# Patient Record
Sex: FEMALE | Race: WHITE | Employment: UNEMPLOYED | URBAN - METROPOLITAN AREA
[De-identification: names, ages, dates, MRNs, and addresses within clinical notes are randomized per-mention and may not be internally consistent; named-entity substitution may affect disease eponyms.]

---

## 2018-04-23 ENCOUNTER — HOSPITAL ENCOUNTER (EMERGENCY)
Age: 53
Discharge: HOME | End: 2018-04-23
Attending: EMERGENCY MEDICINE
Payer: SELF-PAY

## 2018-04-23 VITALS
DIASTOLIC BLOOD PRESSURE: 99 MMHG | OXYGEN SATURATION: 100 % | TEMPERATURE: 98.2 F | HEART RATE: 76 BPM | SYSTOLIC BLOOD PRESSURE: 141 MMHG | WEIGHT: 145 LBS | RESPIRATION RATE: 18 BRPM | HEIGHT: 66 IN | BODY MASS INDEX: 23.3 KG/M2

## 2018-04-23 DIAGNOSIS — T25.422A: Primary | ICD-10-CM

## 2018-04-23 PROCEDURE — 74011250636 HC RX REV CODE- 250/636: Performed by: PHYSICIAN ASSISTANT

## 2018-04-23 PROCEDURE — 99283 EMERGENCY DEPT VISIT LOW MDM: CPT | Performed by: PHYSICIAN ASSISTANT

## 2018-04-23 PROCEDURE — 90715 TDAP VACCINE 7 YRS/> IM: CPT | Performed by: PHYSICIAN ASSISTANT

## 2018-04-23 PROCEDURE — 90471 IMMUNIZATION ADMIN: CPT | Performed by: PHYSICIAN ASSISTANT

## 2018-04-23 RX ORDER — TRAMADOL HYDROCHLORIDE 50 MG/1
50 TABLET ORAL
Qty: 10 TAB | Refills: 0 | Status: SHIPPED | OUTPATIENT
Start: 2018-04-23 | End: 2019-06-27

## 2018-04-23 RX ADMIN — TETANUS TOXOID, REDUCED DIPHTHERIA TOXOID AND ACELLULAR PERTUSSIS VACCINE, ADSORBED 0.5 ML: 5; 2.5; 8; 8; 2.5 SUSPENSION INTRAMUSCULAR at 03:29

## 2018-04-23 NOTE — DISCHARGE INSTRUCTIONS
Wash affected area with soap and water twice daily then apply topical antibiotic ointment. Keep left foot elevated as much as tolerated. Chemical Bhandari: Care Instructions  Your Care Instructions    Sunday Pare can occur when a harmful chemical,such as a cleaning product or an acid, splashes onto the skin. The amount of damage to the skin depends on how strong the chemical was, how much of it was on the skin, and how long it was there. Chemical burns, even minor ones, can be very painful. A minor burn may heal within a few days. But a more serious burn may take weeks or even months to heal completely. When the skin is damaged by a burn, it may become infected. You can help prevent infection and help your burn heal. Keep the burn clean, and change the bandages often. Taking good care of the burn as it heals may help prevent bad scars. The treatment for most chemical burns is to remove the chemical from the skin by flushing the area with plenty of water. But some chemicals can't be removed with water. They may need to be removed from the skin in other ways by the doctor. The doctor has checked your skin carefully, but problems can develop later. If you notice any problems or new symptoms, get medical treatment right away. Follow-up care is a key part of your treatment and safety. Be sure to make and go to all appointments, and call your doctor if you are having problems. It's also a good idea to know your test results and keep a list of the medicines you take. How can you care for yourself at home? · If your doctor told you how to care for your burn, follow your doctor's instructions. If you did not get instructions, follow this general advice:  ¨ Wash the burn with clean water 2 times a day. Don't use hydrogen peroxide or alcohol, which can slow healing. ¨ Gently pat the burn dry after you wash it.   ¨ You may cover the burn with a thin layer of antibiotic ointment, such as bacitracin, and a nonstick bandage. ¨ Apply more ointment and replace the bandage as needed. · Be safe with medicines. Read and follow all instructions on the label. ¨ If the doctor gave you a prescription medicine for pain, take it as prescribed. ¨ If you are not taking a prescription pain medicine, ask your doctor if you can take an over-the-counter medicine. · Don't break blisters open. Broken blisters could get infected. If a blister breaks open by itself, blot up the liquid, and leave the skin that covered the blister. This helps protect the new skin. · Try not to scratch the burn. Talk to your doctor about what to use on the burn for itching. When should you call for help? Call your doctor now or seek immediate medical care if:  ? · Your pain gets worse. ? · You have symptoms of infection, such as:  ¨ Increased pain, swelling, warmth, or redness. ¨ Red streaks leading from the burn. ¨ Pus draining from the burn. ¨ A fever. ? Watch closely for changes in your health, and be sure to contact your doctor if:  ? · The burn is not getting better each day. Where can you learn more? Go to http://patricia-luis.info/. Enter M105 in the search box to learn more about \"Chemical Burns: Care Instructions. \"  Current as of: March 20, 2017  Content Version: 11.4  © 9049-1999 [employer]. Care instructions adapted under license by [employer] (which disclaims liability or warranty for this information). If you have questions about a medical condition or this instruction, always ask your healthcare professional. [place] [address] any warranty or liability for your use of this information.

## 2018-04-23 NOTE — LETTER
[address] [facility] EMERGENCY DEPT [address] [address] [phone] Work/School Note Date: 4/23/2018 To Whom It May concern: 
 
Brittanie Montero was seen and treated today in the emergency room by the following provider(s): 
Attending Provider: Reba Cadet MD 
Physician Assistant: ADRY Lucero. Brittanie Montero may return to work on 4/26/18 or sooner if she feels better. Sincerely, Violeta Saucedo RN
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
## 2018-04-23 NOTE — ED NOTES
I have reviewed discharge instructions with the patient. The patient verbalized understanding. Patient left ED via Discharge Method: ambulatory to Home with self. PT verbalized understanding of d/c instructions and f/u care with the wound clinic. PT given d/c paperwork, woke note and prescription. Pt ambulated out of ed with steady gait. Opportunity for questions and clarification provided. Patient given 1 scripts. To continue your aftercare when you leave the hospital, you may receive an automated call from our care team to check in on how you are doing. This is a free service and part of our promise to provide the best care and service to meet your aftercare needs.  If you have questions, or wish to unsubscribe from this service please call [phone]. Thank you for Choosing our [facility] Emergency Department.

## 2018-12-15 ENCOUNTER — HOSPITAL ENCOUNTER (EMERGENCY)
Age: 53
Discharge: HOME | End: 2018-12-15
Attending: EMERGENCY MEDICINE
Payer: SELF-PAY

## 2018-12-15 VITALS
WEIGHT: 120 LBS | OXYGEN SATURATION: 97 % | HEIGHT: 66 IN | SYSTOLIC BLOOD PRESSURE: 142 MMHG | TEMPERATURE: 98.2 F | BODY MASS INDEX: 19.29 KG/M2 | DIASTOLIC BLOOD PRESSURE: 88 MMHG | HEART RATE: 94 BPM | RESPIRATION RATE: 16 BRPM

## 2018-12-15 DIAGNOSIS — Z91.038 ALLERGIC TO INSECT BITES: ICD-10-CM

## 2018-12-15 DIAGNOSIS — I10 HYPERTENSION, UNSPECIFIED TYPE: ICD-10-CM

## 2018-12-15 DIAGNOSIS — L03.124 ACUTE LYMPHANGITIS OF LEFT UPPER EXTREMITY: Primary | ICD-10-CM

## 2018-12-15 PROCEDURE — 74011250637 HC RX REV CODE- 250/637: Performed by: PHYSICIAN ASSISTANT

## 2018-12-15 PROCEDURE — 87077 CULTURE AEROBIC IDENTIFY: CPT

## 2018-12-15 PROCEDURE — 87205 SMEAR GRAM STAIN: CPT

## 2018-12-15 PROCEDURE — 99283 EMERGENCY DEPT VISIT LOW MDM: CPT | Performed by: EMERGENCY MEDICINE

## 2018-12-15 RX ORDER — HYDROXYZINE PAMOATE 25 MG/1
50 CAPSULE ORAL
Status: COMPLETED | OUTPATIENT
Start: 2018-12-15 | End: 2018-12-15

## 2018-12-15 RX ORDER — HYDROXYZINE 25 MG/1
25 TABLET, FILM COATED ORAL
Qty: 16 TAB | Refills: 0 | Status: SHIPPED | OUTPATIENT
Start: 2018-12-15 | End: 2018-12-19

## 2018-12-15 RX ORDER — MUPIROCIN 20 MG/G
OINTMENT TOPICAL 3 TIMES DAILY
Qty: 22 G | Refills: 0 | Status: SHIPPED | OUTPATIENT
Start: 2018-12-15 | End: 2018-12-25

## 2018-12-15 RX ORDER — CLINDAMYCIN HYDROCHLORIDE 150 MG/1
450 CAPSULE ORAL EVERY 6 HOURS
Qty: 60 CAP | Refills: 0 | Status: SHIPPED | OUTPATIENT
Start: 2018-12-15 | End: 2018-12-15

## 2018-12-15 RX ORDER — CLINDAMYCIN HYDROCHLORIDE 150 MG/1
300 CAPSULE ORAL 3 TIMES DAILY
Qty: 42 CAP | Refills: 0 | Status: SHIPPED | OUTPATIENT
Start: 2018-12-15 | End: 2019-06-27

## 2018-12-15 RX ORDER — CLINDAMYCIN HYDROCHLORIDE 150 MG/1
450 CAPSULE ORAL
Status: COMPLETED | OUTPATIENT
Start: 2018-12-15 | End: 2018-12-15

## 2018-12-15 RX ORDER — CHLORHEXIDINE GLUCONATE 4 G/100ML
1 SOLUTION TOPICAL
Qty: 240 ML | Refills: 0 | Status: SHIPPED | OUTPATIENT
Start: 2018-12-15 | End: 2018-12-30

## 2018-12-15 RX ADMIN — HYDROXYZINE PAMOATE 50 MG: 25 CAPSULE ORAL at 19:16

## 2018-12-15 RX ADMIN — CLINDAMYCIN HYDROCHLORIDE 450 MG: 150 CAPSULE ORAL at 19:05
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
## 2018-12-16 NOTE — DISCHARGE INSTRUCTIONS
Take clindamycin as directed. Use Bactroban ointment as directed on skin lesions. Use Hibiclens-like body wash, this will help prevent infections like this in the future. Drink more fluids. Return to the ER for wound check if no better in 24 hours, return sooner if symptoms worsen. You will be contacted by the ER  in the morning to discuss vouchers for you to  your prescriptions at [facility].

## 2018-12-16 NOTE — ED PROVIDER NOTES
Patient presents to the ER complaining of itchy lesions on inside left wrist from bug bites she know she received yesterday. Now with redness, swelling of the wrist and red streaking up the forearm. Patient states she has a hypersensitivity to bug bites. Left hand reported to be a little \"numb\". Patient denies fever, nausea or vomiting. Has taken nothing for pain. Admits she has been intensely scratching at it lesions. Patient denies throat swelling, tongue swelling or trouble breathing. Has multiple allergies to antibiotics. History reviewed. No pertinent past medical history. History reviewed. No pertinent surgical history. History reviewed. No pertinent family history. Social History     Socioeconomic History    Marital status:      Spouse name: Not on file    Number of children: Not on file    Years of education: Not on file    Highest education level: Not on file   Social Needs    Financial resource strain: Not on file    Food insecurity - worry: Not on file    Food insecurity - inability: Not on file    Transportation needs - medical: Not on file   [email or web address] needs - non-medical: Not on file   Occupational History    Not on file   Tobacco Use    Smoking status: Current Every Day Smoker     Packs/day: 1.00     Years: 30.00     Pack years: 30.00    Smokeless tobacco: Never Used   Substance and Sexual Activity    Alcohol use: Not on file    Drug use: Not on file    Sexual activity: Not on file   Other Topics Concern    Not on file   Social History Narrative    Not on file         ALLERGIES: Latex; Codeine; and Sulfa (sulfonamide antibiotics)    Review of Systems   Constitutional: Negative for chills, diaphoresis and fatigue. Gastrointestinal: Negative for abdominal pain, anal bleeding and nausea. Genitourinary: Negative for dysuria. Musculoskeletal: Positive for myalgias. Neurological: Negative for dizziness and seizures.        Vitals: 12/15/18 1638   BP: (!) 151/95   Pulse: 96   Resp: 16   Temp: 98.2 °F (36.8 °C)   SpO2: 97%   Weight: 54.4 kg (120 lb)   Height: 5' 6\" (1.676 m)            Physical Exam   Constitutional: She appears well-developed and well-nourished. No distress. HENT:   Head: Normocephalic. Eyes: Pupils are equal, round, and reactive to light. Neck: Normal range of motion. Neck supple. Cardiovascular: Normal rate and regular rhythm. Neurological: She is alert. She displays normal reflexes. She exhibits normal muscle tone. Coordination normal.   Skin: She is not diaphoretic. There is erythema. Skin around left wrist is edematous, erythematous and warm consistent with cellulitis. Radial pulse strong. Patient is afebrile   Nursing note and vitals reviewed. MDM  Number of Diagnoses or Management Options  Acute lymphangitis of left upper extremity: new and requires workup  Allergic to insect bites: new and requires workup  Hypertension, unspecified type: new and requires workup     Amount and/or Complexity of Data Reviewed  Clinical lab tests: ordered and reviewed    Risk of Complications, Morbidity, and/or Mortality  Presenting problems: moderate  Diagnostic procedures: moderate  Management options: moderate  General comments: Afebrile patient with hyperactivity reaction to bug bites on left wrist.  Wounds are weeping, wound culture sent to lab. Patient has multiple allergies to antibiotics, states she is willing to try clindamycin does not recall having it before. 15 minutes after being given clindamycin and Atarax, patient reports feeling \"drunk\" and blurred vision. Denies throat closing or tongue swelling, no trouble breathing. Discussed case with Dr. Turner Cooper, who agrees to watch the patient and evaluate in the ED. No blood cultures drawn, patient drinking water in the ED.   Discussed case with Steffen Spencer,  who has the paperwork she needs to generate vouchers for antibiotics for patient to  tomorrow. [facility] is closed tonight until 10 AM tomorrow. Rx for Bactroban, Hibiclens, clindamycin and Atarax given to patient. Has transportation issues, but believes she can find a ride to the pharmacy tomorrow. Patient encouraged to take clindamycin, her reaction after getting the drug is not an allergy as discussed with Dr. Gauri Bell but more a unwanted side effect. Stressed the importance of treating the lymphangitis. Patient instructed to return to the ER if not improving or worsening within the next 24 hours. Return sooner if respiratory issues. Patient agrees with plan. Patient Progress  Patient progress: stable         Procedures    The patient was observed in the ED. Results Reviewed:      No results found for this or any previous visit (from the past 24 hour(s)). I discussed the results of all labs, procedures, radiographs, and treatments with the patient and available family. Treatment plan is agreed upon and the patient is ready for discharge. All voiced understanding of the discharge plan and medication instructions or changes as appropriate. Questions about treatment in the ED were answered. All were encouraged to return should symptoms worsen or new problems develop. 8:43 PM  Patient feels slightly disoriented but no symptoms to suggest allergy. Feel the dose of clindamycin could be lowered. No obvious allergy.

## 2018-12-16 NOTE — ED NOTES
I have reviewed discharge instructions with the patient. The patient verbalized understanding. Patient left ED via Discharge Method: ambulatory to Home with self. Opportunity for questions and clarification provided. Patient given 3 scripts. Medication explained to pt  And pt v\u to medication. Pt in no acute distress at discharge. To continue your aftercare when you leave the hospital, you may receive an automated call from our care team to check in on how you are doing. This is a free service and part of our promise to provide the best care and service to meet your aftercare needs.  If you have questions, or wish to unsubscribe from this service please call [phone]. Thank you for Choosing our [facility] Emergency Department.

## 2018-12-27 LAB
BACTERIA ISLT: ABNORMAL
BACTERIA ISLT: NORMAL
BACTERIA SPEC CULT: NORMAL
GRAM STN SPEC: NORMAL
GRAM STN SPEC: NORMAL
RESULT 1, 080571: NORMAL
RESULT 1, 080571: NORMAL
SERVICE CMNT-IMP: NORMAL
SPECIMEN SOURCE: ABNORMAL
SPECIMEN SOURCE: NORMAL

## 2018-12-28 LAB
BACTERIA ISLT: NORMAL
BACTERIA ISLT: NORMAL
RESULT 1, 080154: NORMAL
RESULT 1, 080154: NORMAL
SPECIMEN SOURCE: NORMAL
SPECIMEN SOURCE: NORMAL

## 2019-06-27 ENCOUNTER — HOSPITAL ENCOUNTER (INPATIENT)
Age: 54
LOS: 6 days | Discharge: HOME | DRG: 390 | End: 2019-07-04
Attending: EMERGENCY MEDICINE | Admitting: HOSPITALIST
Payer: SELF-PAY

## 2019-06-27 ENCOUNTER — APPOINTMENT (OUTPATIENT)
Dept: CT IMAGING | Age: 54
DRG: 390 | End: 2019-06-27
Attending: EMERGENCY MEDICINE
Payer: SELF-PAY

## 2019-06-27 ENCOUNTER — APPOINTMENT (OUTPATIENT)
Dept: GENERAL RADIOLOGY | Age: 54
DRG: 390 | End: 2019-06-27
Attending: EMERGENCY MEDICINE
Payer: SELF-PAY

## 2019-06-27 DIAGNOSIS — K50.012 TERMINAL ILEITIS, WITH INTESTINAL OBSTRUCTION (HCC): ICD-10-CM

## 2019-06-27 DIAGNOSIS — R10.84 ABDOMINAL PAIN, GENERALIZED: Primary | ICD-10-CM

## 2019-06-27 LAB
ALBUMIN SERPL-MCNC: 4.3 G/DL (ref 3.5–5)
ALBUMIN/GLOB SERPL: 1.2 {RATIO} (ref 1.2–3.5)
ALP SERPL-CCNC: 88 U/L (ref 50–136)
ALT SERPL-CCNC: 44 U/L (ref 12–65)
ANION GAP SERPL CALC-SCNC: 4 MMOL/L (ref 7–16)
AST SERPL-CCNC: 22 U/L (ref 15–37)
BASOPHILS # BLD: 0.1 K/UL (ref 0–0.2)
BASOPHILS NFR BLD: 0 % (ref 0–2)
BILIRUB SERPL-MCNC: 0.3 MG/DL (ref 0.2–1.1)
BUN SERPL-MCNC: 17 MG/DL (ref 6–23)
CALCIUM SERPL-MCNC: 9.8 MG/DL (ref 8.3–10.4)
CHLORIDE SERPL-SCNC: 101 MMOL/L (ref 98–107)
CO2 SERPL-SCNC: 33 MMOL/L (ref 21–32)
CREAT SERPL-MCNC: 0.85 MG/DL (ref 0.6–1)
DIFFERENTIAL METHOD BLD: ABNORMAL
EOSINOPHIL # BLD: 0.9 K/UL (ref 0–0.8)
EOSINOPHIL NFR BLD: 6 % (ref 0.5–7.8)
ERYTHROCYTE [DISTWIDTH] IN BLOOD BY AUTOMATED COUNT: 14 % (ref 11.9–14.6)
GLOBULIN SER CALC-MCNC: 3.5 G/DL (ref 2.3–3.5)
GLUCOSE SERPL-MCNC: 114 MG/DL (ref 65–100)
HCT VFR BLD AUTO: 49 % (ref 35.8–46.3)
HGB BLD-MCNC: 15.7 G/DL (ref 11.7–15.4)
IMM GRANULOCYTES # BLD AUTO: 0.1 K/UL (ref 0–0.5)
IMM GRANULOCYTES NFR BLD AUTO: 1 % (ref 0–5)
LYMPHOCYTES # BLD: 3.5 K/UL (ref 0.5–4.6)
LYMPHOCYTES NFR BLD: 22 % (ref 13–44)
MCH RBC QN AUTO: 30.5 PG (ref 26.1–32.9)
MCHC RBC AUTO-ENTMCNC: 32 G/DL (ref 31.4–35)
MCV RBC AUTO: 95.1 FL (ref 79.6–97.8)
MONOCYTES # BLD: 0.9 K/UL (ref 0.1–1.3)
MONOCYTES NFR BLD: 6 % (ref 4–12)
NEUTS SEG # BLD: 10.1 K/UL (ref 1.7–8.2)
NEUTS SEG NFR BLD: 65 % (ref 43–78)
NRBC # BLD: 0 K/UL (ref 0–0.2)
PLATELET # BLD AUTO: 363 K/UL (ref 150–450)
PMV BLD AUTO: 10.1 FL (ref 9.4–12.3)
POTASSIUM SERPL-SCNC: 4.3 MMOL/L (ref 3.5–5.1)
PROT SERPL-MCNC: 7.8 G/DL (ref 6.3–8.2)
RBC # BLD AUTO: 5.15 M/UL (ref 4.05–5.2)
SODIUM SERPL-SCNC: 138 MMOL/L (ref 136–145)
WBC # BLD AUTO: 15.4 K/UL (ref 4.3–11.1)

## 2019-06-27 PROCEDURE — 74011000258 HC RX REV CODE- 258: Performed by: EMERGENCY MEDICINE

## 2019-06-27 PROCEDURE — 99285 EMERGENCY DEPT VISIT HI MDM: CPT | Performed by: EMERGENCY MEDICINE

## 2019-06-27 PROCEDURE — 96375 TX/PRO/DX INJ NEW DRUG ADDON: CPT | Performed by: EMERGENCY MEDICINE

## 2019-06-27 PROCEDURE — 74011250636 HC RX REV CODE- 250/636: Performed by: EMERGENCY MEDICINE

## 2019-06-27 PROCEDURE — 74177 CT ABD & PELVIS W/CONTRAST: CPT

## 2019-06-27 PROCEDURE — 96374 THER/PROPH/DIAG INJ IV PUSH: CPT | Performed by: EMERGENCY MEDICINE

## 2019-06-27 PROCEDURE — 85025 COMPLETE CBC W/AUTO DIFF WBC: CPT

## 2019-06-27 PROCEDURE — 74011636320 HC RX REV CODE- 636/320: Performed by: EMERGENCY MEDICINE

## 2019-06-27 PROCEDURE — 80053 COMPREHEN METABOLIC PANEL: CPT

## 2019-06-27 PROCEDURE — 96361 HYDRATE IV INFUSION ADD-ON: CPT | Performed by: EMERGENCY MEDICINE

## 2019-06-27 PROCEDURE — 74019 RADEX ABDOMEN 2 VIEWS: CPT

## 2019-06-27 RX ORDER — ONDANSETRON 2 MG/ML
4 INJECTION INTRAMUSCULAR; INTRAVENOUS
Status: COMPLETED | OUTPATIENT
Start: 2019-06-27 | End: 2019-06-27

## 2019-06-27 RX ORDER — SODIUM CHLORIDE 9 MG/ML
1000 INJECTION, SOLUTION INTRAVENOUS CONTINUOUS
Status: DISCONTINUED | OUTPATIENT
Start: 2019-06-27 | End: 2019-06-28

## 2019-06-27 RX ORDER — MORPHINE SULFATE 2 MG/ML
4 INJECTION, SOLUTION INTRAMUSCULAR; INTRAVENOUS
Status: DISCONTINUED | OUTPATIENT
Start: 2019-06-27 | End: 2019-06-27

## 2019-06-27 RX ORDER — SODIUM CHLORIDE 0.9 % (FLUSH) 0.9 %
10 SYRINGE (ML) INJECTION
Status: COMPLETED | OUTPATIENT
Start: 2019-06-27 | End: 2019-06-27

## 2019-06-27 RX ORDER — MORPHINE SULFATE 4 MG/ML
2 INJECTION INTRAVENOUS
Status: COMPLETED | OUTPATIENT
Start: 2019-06-27 | End: 2019-06-27

## 2019-06-27 RX ADMIN — DIATRIZOATE MEGLUMINE AND DIATRIZOATE SODIUM 15 ML: 660; 100 LIQUID ORAL; RECTAL at 22:34

## 2019-06-27 RX ADMIN — ONDANSETRON 4 MG: 2 INJECTION INTRAMUSCULAR; INTRAVENOUS at 22:20

## 2019-06-27 RX ADMIN — SODIUM CHLORIDE 1000 ML: 900 INJECTION, SOLUTION INTRAVENOUS at 21:30

## 2019-06-27 RX ADMIN — SODIUM CHLORIDE 100 ML: 900 INJECTION, SOLUTION INTRAVENOUS at 23:43

## 2019-06-27 RX ADMIN — MORPHINE SULFATE 2 MG: 4 INJECTION INTRAVENOUS at 22:25

## 2019-06-27 RX ADMIN — Medication 10 ML: at 23:42

## 2019-06-27 RX ADMIN — IOPAMIDOL 100 ML: 755 INJECTION, SOLUTION INTRAVENOUS at 23:42

## 2019-06-27 RX ADMIN — ONDANSETRON 4 MG: 2 INJECTION INTRAMUSCULAR; INTRAVENOUS at 21:30

## 2019-06-28 PROBLEM — K50.00 TERMINAL ILEITIS (HCC): Status: ACTIVE | Noted: 2019-06-28

## 2019-06-28 PROBLEM — K56.609 SBO (SMALL BOWEL OBSTRUCTION) (HCC): Status: ACTIVE | Noted: 2019-06-28

## 2019-06-28 PROBLEM — D72.829 LEUKOCYTOSIS: Status: ACTIVE | Noted: 2019-06-28

## 2019-06-28 LAB
ANION GAP SERPL CALC-SCNC: 7 MMOL/L (ref 7–16)
BUN SERPL-MCNC: 16 MG/DL (ref 6–23)
CALCIUM SERPL-MCNC: 8.6 MG/DL (ref 8.3–10.4)
CHLORIDE SERPL-SCNC: 107 MMOL/L (ref 98–107)
CO2 SERPL-SCNC: 28 MMOL/L (ref 21–32)
CREAT SERPL-MCNC: 0.89 MG/DL (ref 0.6–1)
GLUCOSE SERPL-MCNC: 108 MG/DL (ref 65–100)
LACTATE BLD-SCNC: 1.37 MMOL/L (ref 0.5–1.9)
POTASSIUM SERPL-SCNC: 4.3 MMOL/L (ref 3.5–5.1)
SODIUM SERPL-SCNC: 142 MMOL/L (ref 136–145)

## 2019-06-28 PROCEDURE — 74011250636 HC RX REV CODE- 250/636: Performed by: INTERNAL MEDICINE

## 2019-06-28 PROCEDURE — 74011250637 HC RX REV CODE- 250/637: Performed by: INTERNAL MEDICINE

## 2019-06-28 PROCEDURE — 74011250636 HC RX REV CODE- 250/636

## 2019-06-28 PROCEDURE — 0D9670Z DRAINAGE OF STOMACH WITH DRAINAGE DEVICE, VIA NATURAL OR ARTIFICIAL OPENING: ICD-10-PCS | Performed by: EMERGENCY MEDICINE

## 2019-06-28 PROCEDURE — 74011000258 HC RX REV CODE- 258: Performed by: EMERGENCY MEDICINE

## 2019-06-28 PROCEDURE — 77030020263 HC SOL INJ SOD CL0.9% LFCR 1000ML

## 2019-06-28 PROCEDURE — 74011250636 HC RX REV CODE- 250/636: Performed by: HOSPITALIST

## 2019-06-28 PROCEDURE — 77030032490 HC SLV COMPR SCD KNE COVD -B

## 2019-06-28 PROCEDURE — 74011250636 HC RX REV CODE- 250/636: Performed by: EMERGENCY MEDICINE

## 2019-06-28 PROCEDURE — 83605 ASSAY OF LACTIC ACID: CPT

## 2019-06-28 PROCEDURE — 74011000250 HC RX REV CODE- 250: Performed by: HOSPITALIST

## 2019-06-28 PROCEDURE — 74011000258 HC RX REV CODE- 258: Performed by: INTERNAL MEDICINE

## 2019-06-28 PROCEDURE — 65270000029 HC RM PRIVATE

## 2019-06-28 PROCEDURE — 80048 BASIC METABOLIC PNL TOTAL CA: CPT

## 2019-06-28 PROCEDURE — 74011000250 HC RX REV CODE- 250: Performed by: INTERNAL MEDICINE

## 2019-06-28 PROCEDURE — 86480 TB TEST CELL IMMUN MEASURE: CPT

## 2019-06-28 PROCEDURE — 96375 TX/PRO/DX INJ NEW DRUG ADDON: CPT | Performed by: EMERGENCY MEDICINE

## 2019-06-28 PROCEDURE — 77030008771 HC TU NG SALEM SUMP -A

## 2019-06-28 RX ORDER — METRONIDAZOLE 500 MG/100ML
250 INJECTION, SOLUTION INTRAVENOUS EVERY 8 HOURS
Status: DISCONTINUED | OUTPATIENT
Start: 2019-06-28 | End: 2019-06-30

## 2019-06-28 RX ORDER — PIPERACILLIN SODIUM, TAZOBACTAM SODIUM 4; .5 G/20ML; G/20ML
INJECTION, POWDER, LYOPHILIZED, FOR SOLUTION INTRAVENOUS
Status: DISCONTINUED
Start: 2019-06-28 | End: 2019-06-28

## 2019-06-28 RX ORDER — CIPROFLOXACIN 2 MG/ML
400 INJECTION, SOLUTION INTRAVENOUS EVERY 12 HOURS
Status: DISCONTINUED | OUTPATIENT
Start: 2019-06-28 | End: 2019-06-28

## 2019-06-28 RX ORDER — SODIUM CHLORIDE 900 MG/100ML
INJECTION INTRAVENOUS
Status: DISCONTINUED
Start: 2019-06-28 | End: 2019-06-28 | Stop reason: WASHOUT

## 2019-06-28 RX ORDER — FAMOTIDINE 10 MG/ML
INJECTION INTRAVENOUS
Status: ACTIVE
Start: 2019-06-28 | End: 2019-06-28

## 2019-06-28 RX ORDER — FACIAL-BODY WIPES
10 EACH TOPICAL DAILY
Status: DISCONTINUED | OUTPATIENT
Start: 2019-06-28 | End: 2019-07-03 | Stop reason: HOSPADM

## 2019-06-28 RX ORDER — SODIUM CHLORIDE 0.9 % (FLUSH) 0.9 %
5-40 SYRINGE (ML) INJECTION EVERY 8 HOURS
Status: DISCONTINUED | OUTPATIENT
Start: 2019-06-28 | End: 2019-07-03 | Stop reason: HOSPADM

## 2019-06-28 RX ORDER — SODIUM CHLORIDE 0.9 % (FLUSH) 0.9 %
5-40 SYRINGE (ML) INJECTION AS NEEDED
Status: DISCONTINUED | OUTPATIENT
Start: 2019-06-28 | End: 2019-07-03 | Stop reason: HOSPADM

## 2019-06-28 RX ORDER — NALOXONE HYDROCHLORIDE 0.4 MG/ML
0.4 INJECTION, SOLUTION INTRAMUSCULAR; INTRAVENOUS; SUBCUTANEOUS AS NEEDED
Status: DISCONTINUED | OUTPATIENT
Start: 2019-06-28 | End: 2019-07-03 | Stop reason: HOSPADM

## 2019-06-28 RX ORDER — ONDANSETRON 2 MG/ML
4 INJECTION INTRAMUSCULAR; INTRAVENOUS
Status: DISCONTINUED | OUTPATIENT
Start: 2019-06-28 | End: 2019-07-03 | Stop reason: HOSPADM

## 2019-06-28 RX ORDER — HYDRALAZINE HYDROCHLORIDE 20 MG/ML
10 INJECTION INTRAMUSCULAR; INTRAVENOUS
Status: DISCONTINUED | OUTPATIENT
Start: 2019-06-28 | End: 2019-07-03 | Stop reason: HOSPADM

## 2019-06-28 RX ORDER — LORAZEPAM 2 MG/ML
INJECTION INTRAMUSCULAR
Status: ACTIVE
Start: 2019-06-28 | End: 2019-06-28

## 2019-06-28 RX ORDER — MORPHINE SULFATE 4 MG/ML
4 INJECTION INTRAVENOUS
Status: COMPLETED | OUTPATIENT
Start: 2019-06-28 | End: 2019-06-28

## 2019-06-28 RX ORDER — ONDANSETRON 2 MG/ML
4 INJECTION INTRAMUSCULAR; INTRAVENOUS
Status: COMPLETED | OUTPATIENT
Start: 2019-06-28 | End: 2019-06-28

## 2019-06-28 RX ORDER — ONDANSETRON 2 MG/ML
INJECTION INTRAMUSCULAR; INTRAVENOUS
Status: ACTIVE
Start: 2019-06-28 | End: 2019-06-28

## 2019-06-28 RX ORDER — MORPHINE SULFATE 4 MG/ML
INJECTION INTRAVENOUS
Status: ACTIVE
Start: 2019-06-28 | End: 2019-06-28

## 2019-06-28 RX ORDER — SODIUM CHLORIDE 9 MG/ML
125 INJECTION, SOLUTION INTRAVENOUS CONTINUOUS
Status: DISCONTINUED | OUTPATIENT
Start: 2019-06-28 | End: 2019-07-03 | Stop reason: HOSPADM

## 2019-06-28 RX ORDER — SODIUM CHLORIDE 9 MG/ML
INJECTION INTRAMUSCULAR; INTRAVENOUS; SUBCUTANEOUS
Status: ACTIVE
Start: 2019-06-28 | End: 2019-06-28

## 2019-06-28 RX ORDER — SODIUM CHLORIDE 900 MG/100ML
INJECTION INTRAVENOUS
Status: ACTIVE
Start: 2019-06-28 | End: 2019-06-28

## 2019-06-28 RX ORDER — LORAZEPAM 2 MG/ML
1 INJECTION INTRAMUSCULAR
Status: COMPLETED | OUTPATIENT
Start: 2019-06-28 | End: 2019-06-28

## 2019-06-28 RX ORDER — MORPHINE SULFATE 2 MG/ML
2 INJECTION, SOLUTION INTRAMUSCULAR; INTRAVENOUS
Status: DISCONTINUED | OUTPATIENT
Start: 2019-06-28 | End: 2019-07-03 | Stop reason: HOSPADM

## 2019-06-28 RX ORDER — DIPHENHYDRAMINE HYDROCHLORIDE 50 MG/ML
12.5 INJECTION, SOLUTION INTRAMUSCULAR; INTRAVENOUS
Status: DISCONTINUED | OUTPATIENT
Start: 2019-06-28 | End: 2019-07-03 | Stop reason: HOSPADM

## 2019-06-28 RX ADMIN — MORPHINE SULFATE 2 MG: 2 INJECTION, SOLUTION INTRAMUSCULAR; INTRAVENOUS at 14:02

## 2019-06-28 RX ADMIN — Medication 10 ML: at 13:34

## 2019-06-28 RX ADMIN — PIPERACILLIN SODIUM,TAZOBACTAM SODIUM 4.5 G: 4; .5 INJECTION, POWDER, FOR SOLUTION INTRAVENOUS at 02:28

## 2019-06-28 RX ADMIN — FAMOTIDINE 20 MG: 10 INJECTION, SOLUTION INTRAVENOUS at 21:37

## 2019-06-28 RX ADMIN — MORPHINE SULFATE 4 MG: 4 INJECTION INTRAVENOUS at 02:14

## 2019-06-28 RX ADMIN — ONDANSETRON 4 MG: 2 INJECTION INTRAMUSCULAR; INTRAVENOUS at 01:32

## 2019-06-28 RX ADMIN — METRONIDAZOLE 250 MG: 500 INJECTION, SOLUTION INTRAVENOUS at 21:35

## 2019-06-28 RX ADMIN — Medication 5 ML: at 06:00

## 2019-06-28 RX ADMIN — FAMOTIDINE 20 MG: 10 INJECTION, SOLUTION INTRAVENOUS at 02:29

## 2019-06-28 RX ADMIN — AZTREONAM 1 G: 1 INJECTION, POWDER, LYOPHILIZED, FOR SOLUTION INTRAMUSCULAR; INTRAVENOUS at 21:37

## 2019-06-28 RX ADMIN — AZTREONAM 1 G: 1 INJECTION, POWDER, LYOPHILIZED, FOR SOLUTION INTRAMUSCULAR; INTRAVENOUS at 14:05

## 2019-06-28 RX ADMIN — BISACODYL 10 MG: 10 SUPPOSITORY RECTAL at 03:03

## 2019-06-28 RX ADMIN — ONDANSETRON 4 MG: 2 INJECTION INTRAMUSCULAR; INTRAVENOUS at 14:11

## 2019-06-28 RX ADMIN — METRONIDAZOLE 250 MG: 500 INJECTION, SOLUTION INTRAVENOUS at 13:33

## 2019-06-28 RX ADMIN — SODIUM CHLORIDE 125 ML/HR: 900 INJECTION, SOLUTION INTRAVENOUS at 13:00

## 2019-06-28 RX ADMIN — FAMOTIDINE 20 MG: 10 INJECTION, SOLUTION INTRAVENOUS at 11:06

## 2019-06-28 RX ADMIN — BENZOCAINE: 200 SPRAY DENTAL; ORAL; PERIODONTAL at 17:00

## 2019-06-28 RX ADMIN — LORAZEPAM 1 MG: 2 INJECTION INTRAMUSCULAR; INTRAVENOUS at 02:14

## 2019-06-28 RX ADMIN — METRONIDAZOLE 250 MG: 500 INJECTION, SOLUTION INTRAVENOUS at 05:36

## 2019-06-28 NOTE — PROGRESS NOTES
Progress Note    Patient: Ruby Kirkland MRN: [id number]  SSN: [id number]    YOB: 1965  Age: 48 y.o. Sex: female      Admit Date: 6/27/2019    LOS: 0 days     Subjective:     Admitted due to constipation and abdominal pain. Found to have small bowel obstruction with ileitis. On NG tube and NPO now. No fever. No shaking. No chills. Objective:     Vitals:    06/28/19 0500 06/28/19 0530 06/28/19 0600 06/28/19 0630   BP: 121/80 109/79 105/72 124/83   Pulse: 80 79 83 76   Resp:   18    Temp:   98.7 °F (37.1 °C)    SpO2: 93% 94% 95% 94%   Weight:       Height:            Intake and Output:  Current Shift: 06/28 0701 - 06/28 1900  In: -   Out: 350   Last three shifts: 06/26 1901 - 06/28 0700  In: 1100 [I.V.:1100]  Out: -     Physical Exam:   General:                    The patient is a pleasant middle aged female in no acute respiratory distress. NG tube in place. Talking. Head:                                   Normocephalic/atraumatic. Eyes:                                   No palpebral pallor or scleral icterus. ENT:                                    External auricular and nasal exam within normal limits. Mucous membranes are moist.  Neck:                                   Supple, non-tender, no JVD. Lungs:                       Clear to auscultation bilaterally without wheezes or crackles. No respiratory distress or accessory muscle use. Heart:                                  Regular rate and rhythm, without murmurs, rubs, or gallops. Abdomen:                  Soft, mildly tender, moderately distended with normoactive bowel sounds. Genitourinary:           No tenderness over the bladder or bilateral CVAs. Extremities:               Without clubbing, cyanosis, or edema. Skin:                                    Normal color, texture, and turgor.  No rashes, lesions, or jaundice. Pulses:                      Radial and dorsalis pedis pulses present 2+ bilaterally. Capillary refill <2s. Neurologic:                CN II-XII grossly intact and symmetrical.                                               Moving all four extremities well with no focal deficits. Psychiatric:                Pleasant demeanor, appropriate affect. Alert and oriented x 3      Lab/Data Review:    Recent Results (from the past 24 hour(s))   CBC WITH AUTOMATED DIFF    Collection Time: 06/27/19  8:59 PM   Result Value Ref Range    WBC 15.4 (H) 4.3 - 11.1 K/uL    RBC 5.15 4.05 - 5.2 M/uL    HGB 15.7 (H) 11.7 - 15.4 g/dL    HCT 49.0 (H) 35.8 - 46.3 %    MCV 95.1 79.6 - 97.8 FL    MCH 30.5 26.1 - 32.9 PG    MCHC 32.0 31.4 - 35.0 g/dL    RDW 14.0 11.9 - 14.6 %    PLATELET 429 283 - 655 K/uL    MPV 10.1 9.4 - 12.3 FL    ABSOLUTE NRBC 0.00 0.0 - 0.2 K/uL    DF AUTOMATED      NEUTROPHILS 65 43 - 78 %    LYMPHOCYTES 22 13 - 44 %    MONOCYTES 6 4.0 - 12.0 %    EOSINOPHILS 6 0.5 - 7.8 %    BASOPHILS 0 0.0 - 2.0 %    IMMATURE GRANULOCYTES 1 0.0 - 5.0 %    ABS. NEUTROPHILS 10.1 (H) 1.7 - 8.2 K/UL    ABS. LYMPHOCYTES 3.5 0.5 - 4.6 K/UL    ABS. MONOCYTES 0.9 0.1 - 1.3 K/UL    ABS. EOSINOPHILS 0.9 (H) 0.0 - 0.8 K/UL    ABS. BASOPHILS 0.1 0.0 - 0.2 K/UL    ABS. IMM. GRANS. 0.1 0.0 - 0.5 K/UL   METABOLIC PANEL, COMPREHENSIVE    Collection Time: 06/27/19  8:59 PM   Result Value Ref Range    Sodium 138 136 - 145 mmol/L    Potassium 4.3 3.5 - 5.1 mmol/L    Chloride 101 98 - 107 mmol/L    CO2 33 (H) 21 - 32 mmol/L    Anion gap 4 (L) 7 - 16 mmol/L    Glucose 114 (H) 65 - 100 mg/dL    BUN 17 6 - 23 MG/DL    Creatinine 0.85 0.6 - 1.0 MG/DL    GFR est AA >60 >60 ml/min/1.73m2    GFR est non-AA >60 >60 ml/min/1.73m2    Calcium 9.8 8.3 - 10.4 MG/DL    Bilirubin, total 0.3 0.2 - 1.1 MG/DL    ALT (SGPT) 44 12 - 65 U/L    AST (SGOT) 22 15 - 37 U/L    Alk.  phosphatase 88 50 - 136 U/L    Protein, total 7.8 6.3 - 8.2 g/dL    Albumin 4.3 3.5 - 5.0 g/dL    Globulin 3.5 2.3 - 3.5 g/dL    A-G Ratio 1.2 1.2 - 3.5     POC LACTIC ACID    Collection Time: 06/28/19 12:31 AM   Result Value Ref Range    Lactic Acid (POC) 1.37 0.5 - 1.9 mmol/L   METABOLIC PANEL, BASIC    Collection Time: 06/28/19 11:03 AM   Result Value Ref Range    Sodium 142 136 - 145 mmol/L    Potassium 4.3 3.5 - 5.1 mmol/L    Chloride 107 98 - 107 mmol/L    CO2 28 21 - 32 mmol/L    Anion gap 7 7 - 16 mmol/L    Glucose 108 (H) 65 - 100 mg/dL    BUN 16 6 - 23 MG/DL    Creatinine 0.89 0.6 - 1.0 MG/DL    GFR est AA >60 >60 ml/min/1.73m2    GFR est non-AA >60 >60 ml/min/1.73m2    Calcium 8.6 8.3 - 10.4 MG/DL     CT abdomen and pelvis   6-  IMPRESSION:     Terminal ileitis resulting in a small bowel obstruction. If chronic this could  represent the presence of a stricture. Consider the possibility of infection  Crohn's disease in backwash ileitis from ulcerative colitis is the most likely  Possibilities. XR abdomen   6-  IMPRESSION: Prominent loop of small bowel mid abdomen.  Ileus versus obstruction.       Current Facility-Administered Medications:     sodium chloride (NS) flush 5-40 mL, 5-40 mL, IntraVENous, Q8H, Poncho Dixon MD, 5 mL at 06/28/19 0600    sodium chloride (NS) flush 5-40 mL, 5-40 mL, IntraVENous, PRN, Poncho Dixon MD    diphenhydrAMINE (BENADRYL) injection 12.5 mg, 12.5 mg, IntraVENous, Q4H PRN, Poncho Dixon MD    ondansetron [facility]) injection 4 mg, 4 mg, IntraVENous, Q4H PRN, Poncho Dixon MD    acetaminophen (TYLENOL) solution 650 mg, 650 mg, Oral, Q4H PRN, Poncho Dixon MD    morphine injection 2 mg, 2 mg, IntraVENous, Q3H PRN, Poncho Dixon MD    naloxone [facility]) injection 0.4 mg, 0.4 mg, IntraVENous, PRN, Poncho Dixon MD    famotidine (PF) (PEPCID) 20 mg in sodium chloride 0.9% 10 mL injection, 20 mg, IntraVENous, Q12H, Poncho Dixon MD, 20 mg at 06/28/19 1106   morphine 4 mg/mL injection, , , ,     LORazepam (ATIVAN) 2 mg/mL injection, , , ,     ondansetron (ZOFRAN) 4 mg/2 mL injection, , , ,     sodium chloride 0.9% injection, , , ,     ADDaptor, , , ,     famotidine (PF) (PEPCID) 20 mg/2 mL injection, , , ,     0.9% sodium chloride (MBP/ADV) infusion, , , ,     bisacodyl (DULCOLAX) suppository 10 mg, 10 mg, Rectal, DAILY, Justice Weaver MD, 10 mg at 06/28/19 0303    ciprofloxacin (CIPRO) 400 mg in D5W IVPB (premix), 400 mg, IntraVENous, Q12H, Spike Weaver MD    metroNIDAZOLE (FLAGYL) IVPB premix 250 mg, 250 mg, IntraVENous, Q8H, Spike Weaver MD, Stopped at 06/28/19 0636    hydrALAZINE (APRESOLINE) 20 mg/mL injection 10 mg, 10 mg, IntraVENous, Q6H PRN, Jessie Mart MD    0.9% sodium chloride infusion 1,000 mL, 1,000 mL, IntraVENous, CONTINUOUS, Ta, Rody Antoine MD, Stopped at 06/28/19 0052      Assessment:     Principal Problem:    SBO (small bowel obstruction) ([facility] [address] (6/28/2019)    Active Problems:    Terminal ileitis ([facility] [address] (6/28/2019)      Leukocytosis (6/28/2019)        Plan:     Small bowel obstruction. Ileitis  Continue NPO  IV fluid. NG tube in place. Symptomatic treatments   GI is following. Plan for endoscopy. Empiric IV antibiotics. I have discussed the plan of care with patient.       DVT prophylaxis : SCD       Signed By: Marsha Ritter MD     June 28, 2019

## 2019-06-28 NOTE — PROGRESS NOTES
Admission assessment complete. Pt resting in bed. A&Ox4. Respirations present, even, unlabored. Lung sounds clear to auscultation. HR regular, S1&S2 auscultated. IV capped and patent. Pt denies pain at this time. NG tube to low intermittent suction. Encouraged to call for help when needed, bed in lowest position, call light within reach, side rails x3.

## 2019-06-28 NOTE — ED TRIAGE NOTES
Pt states severe abdominal pain that started a couple days ago. She is also distended, has not had a BM in a month or more and thinks she is vomiting stool.

## 2019-06-28 NOTE — PROGRESS NOTES
06/28/19 1251   Dual Skin Pressure Injury Assessment   Dual Skin Pressure Injury Assessment WDL   Second Care Provider (Based on [address] [place] [address]) German Ray RN

## 2019-06-28 NOTE — PROGRESS NOTES
Patient pulled her NG tube out of her nare. She notes she doesn't remember doing it, but she feels with the NG out, she didn't receive all of her IV morphine. I explained to the patient her medication was given IV and the two do not connect. Spoke with Dr. Annelise Mendenhall and received order to replace NG tube on low intermittent suction.

## 2019-06-28 NOTE — ED NOTES
Patient got out the bed and urinate on the floor. Patient is drowsy. Patient is help back in bed and is now resting. Will continue to monitor.

## 2019-06-28 NOTE — H&P
Hospitalist H&P/Consult Note     Admit Date:  2019  9:13 PM   Name:  Jada Arias   Age:  48 y.o.  :  1965   MRN:  [id number]   PCP:  Nicolasa Haddad MD  Treatment Team: Attending Provider: Dharmesh Canela MD; Primary Nurse: Alvarez Malik    HPI:   Patient is a 47 y/o female with no known chronic medical problems who presents to ED with severe right sided abdominal pain x 2 days and constipation. States no BM in at least a month and thinks she is vomiting fecal matter. Her WBC ct is 15.4 but lactic acid only 1.37. CT abdomen shows terminal ileitis resulting in a small bowel obstruction. Only abdominal surgery was a hysterectomy. Thinks there is a family hx of inflammatory bowel disease. She has been started on antibiotics and Hospitalist service consulted for admission. 10 systems reviewed and negative except as noted in HPI. History reviewed. No pertinent past medical history. Past Surgical History:   Procedure Laterality Date    HX GYN      hysterectomy      None     Allergies   Allergen Reactions    Latex Rash    Codeine Rash    Sulfa (Sulfonamide Antibiotics) Rash      Social History     Tobacco Use    Smoking status: Current Every Day Smoker     Packs/day: 1.00     Years: 30.00     Pack years: 30.00    Smokeless tobacco: Never Used   Substance Use Topics    Alcohol use: Not Currently      History reviewed. No pertinent family history.    Immunization History   Administered Date(s) Administered    Tdap 2018       Objective:     Patient Vitals for the past 24 hrs:   Temp Pulse Resp BP SpO2   19  83  (!) 166/93 95 %   19 0020  76  (!) 160/97 98 %   19 0019  75  (!) 164/98 98 %   19 2330  78  (!) 183/109 96 %   19 2314  80   97 %   19 2049    (!) 171/119    19 2048 97.7 °F (36.5 °C)  17  99 %     Oxygen Therapy  O2 Sat (%): 95 % (19)  Pulse via Oximetry: 83 beats per minute (19)  O2 Device: Room air (06/27/19 2129)    Intake/Output Summary (Last 24 hours) at 6/28/2019 0320  Last data filed at 6/28/2019 0052  Gross per 24 hour   Intake 1100 ml   Output    Net 1100 ml       Physical Exam:  General:    Well nourished. Somnolent. NG tube placed    Eyes:   Normal sclera. Extraocular movements intact. ENT:  Normocephalic, atraumatic. Moist mucous membranes  CV:   RRR. No murmur, rub, or gallop. Lungs:  CTAB. No wheezing, rhonchi, or rales. Abdomen: Soft, tender RLQ  Extremities: Warm and dry. No cyanosis or edema. Neurologic: CN II-XII grossly intact. Sensation intact. Skin:     No rashes or jaundice. No wounds. Psych:  Normal mood. Somnolent from pain medications    I reviewed the labs, imaging, EKGs, telemetry, and other studies done this admission. Data Review:   Recent Results (from the past 24 hour(s))   CBC WITH AUTOMATED DIFF    Collection Time: 06/27/19  8:59 PM   Result Value Ref Range    WBC 15.4 (H) 4.3 - 11.1 K/uL    RBC 5.15 4.05 - 5.2 M/uL    HGB 15.7 (H) 11.7 - 15.4 g/dL    HCT 49.0 (H) 35.8 - 46.3 %    MCV 95.1 79.6 - 97.8 FL    MCH 30.5 26.1 - 32.9 PG    MCHC 32.0 31.4 - 35.0 g/dL    RDW 14.0 11.9 - 14.6 %    PLATELET 528 307 - 365 K/uL    MPV 10.1 9.4 - 12.3 FL    ABSOLUTE NRBC 0.00 0.0 - 0.2 K/uL    DF AUTOMATED      NEUTROPHILS 65 43 - 78 %    LYMPHOCYTES 22 13 - 44 %    MONOCYTES 6 4.0 - 12.0 %    EOSINOPHILS 6 0.5 - 7.8 %    BASOPHILS 0 0.0 - 2.0 %    IMMATURE GRANULOCYTES 1 0.0 - 5.0 %    ABS. NEUTROPHILS 10.1 (H) 1.7 - 8.2 K/UL    ABS. LYMPHOCYTES 3.5 0.5 - 4.6 K/UL    ABS. MONOCYTES 0.9 0.1 - 1.3 K/UL    ABS. EOSINOPHILS 0.9 (H) 0.0 - 0.8 K/UL    ABS. BASOPHILS 0.1 0.0 - 0.2 K/UL    ABS. IMM.  GRANS. 0.1 0.0 - 0.5 K/UL   METABOLIC PANEL, COMPREHENSIVE    Collection Time: 06/27/19  8:59 PM   Result Value Ref Range    Sodium 138 136 - 145 mmol/L    Potassium 4.3 3.5 - 5.1 mmol/L    Chloride 101 98 - 107 mmol/L    CO2 33 (H) 21 - 32 mmol/L    Anion gap 4 (L) 7 - 16 mmol/L    Glucose 114 (H) 65 - 100 mg/dL    BUN 17 6 - 23 MG/DL    Creatinine 0.85 0.6 - 1.0 MG/DL    GFR est AA >60 >60 ml/min/1.73m2    GFR est non-AA >60 >60 ml/min/1.73m2    Calcium 9.8 8.3 - 10.4 MG/DL    Bilirubin, total 0.3 0.2 - 1.1 MG/DL    ALT (SGPT) 44 12 - 65 U/L    AST (SGOT) 22 15 - 37 U/L    Alk. phosphatase 88 50 - 136 U/L    Protein, total 7.8 6.3 - 8.2 g/dL    Albumin 4.3 3.5 - 5.0 g/dL    Globulin 3.5 2.3 - 3.5 g/dL    A-G Ratio 1.2 1.2 - 3.5     POC LACTIC ACID    Collection Time: 06/28/19 12:31 AM   Result Value Ref Range    Lactic Acid (POC) 1.37 0.5 - 1.9 mmol/L       Imaging Studies:  CXR Results  (Last 48 hours)    None        CT Results  (Last 48 hours)               06/27/19 2346  CT ABD PELV W CONT Final result    Impression:  IMPRESSION:       Terminal ileitis resulting in a small bowel obstruction. If chronic this could   represent the presence of a stricture. Consider the possibility of infection   Crohn's disease in [facility] ileitis from ulcerative colitis is the most likely   possibilities. Date of Dictation: 6/27/2019 11:59 PM               Narrative:  CT ABDOMEN AND PELVIS WITH CONTRAST       HISTORY: Abdominal pain       COMPARISON: None       TECHNIQUE: Helical imaging was performed from the lung bases through the ischial   tuberosities during the intravenous infusion of 100 cc of Isovue-370. Oral   contrast was administered. Coronal and sagittal reformats were performed. Dose reduction techniques used: Automated exposure control, adjustment of the   mAs and/or kVp according to patient's size, and iterative reconstruction   techniques. FINDINGS:   *  LUNG BASES: Within normal limits. *  LIVER: Within normal limits. *  GALLBLADDER AND BILE DUCTS: Normal.   *  SPLEEN: Within normal limits. *  URINARY TRACT: Within normal limits. *  ADRENALS: Within normal limits. *  PANCREAS: Within normal limits.    *  GASTROINTESTINAL TRACT: Thickened, edematous and hyperenhancing mucosa of the   terminal ileum with a proximal small bowel obstruction. *  RETROPERITONEUM: Within normal limits. *  PERITONEAL CAVITY AND ABDOMINAL WALL: Within normal limits. *  PELVIS: Within normal limits. *  SPINE / BONES: Within normal limits. *  OTHER COMMENTS: None. Assessment and Plan:     Hospital Problems as of 6/28/2019 Never Reviewed          Codes Class Noted - Resolved POA    * (Principal) SBO (small bowel obstruction) ([facility] ICD-10-CM: A40.248  ICD-9-CM: 560.9  6/28/2019 - Present Yes        Terminal ileitis ([facility] ICD-10-CM: K50.00  ICD-9-CM: 555.0  6/28/2019 - Present Yes        Leukocytosis ICD-10-CM: G94.494  ICD-9-CM: 288.60  6/28/2019 - Present Yes              PLAN:  · Admit inpatient to medical bed  · NPO. NGT to intermittent suction  · IV abx--cipro/flagyl as per GI recommendations  · GI consultation. May represent new inflammatory bowel disease  · Pain control, antiemetics, IV pepcid  · Follow CBC, BMP.  Mg, lactic acid  · Prn hydralazine for elevated blood pressure  · SCDs for DVT prophylaxis      Estimated LOS: 2 or more midnights    Signed:  Chirag Soares MD

## 2019-06-28 NOTE — PROGRESS NOTES
GI--Consult received--see dictated note and orders. Will change antibiotics to Cipro/Flagyl. Will follow.   Thanks Clayton Goetz MD

## 2019-06-28 NOTE — CONSULTS
[address]  [address] [facility], [address] [place] [address], [address] [facility]       History and Physical/Surgical Consult   Jada Arias    Admit date: 2019    MRN: [id number]     : 1965     Age: 48 y.o.          2019 4:40 PM    Subjective/HPI:   This patient is a 48 y.o. seen and evaluated at the request of Hospitalist.  Previously healthy female complaining of abdominal pain for 2 days. Pain was described as diffuse and poorly localized. The pain was 6/10. Yesterday she developed N/V with emesis times two. She denies previous episodes. Denies history of inflammatory bowel disease. She denies changes in her bowel habits except for constipation. CT in the ED showed terminal ileitis with proximal obstruction. WBC is 15. Surgery is consulted for terminal ileitis with SBO. Lebron Gu Review of Systems  A comprehensive review of systems was negative except for that written in the HPI. History reviewed. No pertinent past medical history. Past Surgical History:   Procedure Laterality Date    HX GYN      hysterectomy      Allergies   Allergen Reactions    Latex Rash    Penicillins Anaphylaxis    Ciprofloxacin Hives    Codeine Rash    Hydrocodone Hives    Keflex [Cephalexin] Hives    Sulfa (Sulfonamide Antibiotics) Rash      Social History     Tobacco Use    Smoking status: Current Every Day Smoker     Packs/day: 1.00     Years: 30.00     Pack years: 30.00    Smokeless tobacco: Never Used   Substance Use Topics    Alcohol use: Not Currently      Social History     Social History Narrative    Not on file     History reviewed. No pertinent family history.    None     Current Facility-Administered Medications   Medication Dose Route Frequency    sodium chloride (NS) flush 5-40 mL  5-40 mL IntraVENous Q8H    sodium chloride (NS) flush 5-40 mL  5-40 mL IntraVENous PRN    diphenhydrAMINE (BENADRYL) injection 12.5 mg  12.5 mg IntraVENous Q4H PRN    ondansetron [facility]) injection 4 mg  4 mg IntraVENous Q4H PRN    acetaminophen (TYLENOL) solution 650 mg  650 mg Oral Q4H PRN    morphine injection 2 mg  2 mg IntraVENous Q3H PRN    naloxone (NARCAN) injection 0.4 mg  0.4 mg IntraVENous PRN    famotidine (PF) (PEPCID) 20 mg in sodium chloride 0.9% 10 mL injection  20 mg IntraVENous Q12H    bisacodyl (DULCOLAX) suppository 10 mg  10 mg Rectal DAILY    metroNIDAZOLE (FLAGYL) IVPB premix 250 mg  250 mg IntraVENous Q8H    hydrALAZINE (APRESOLINE) 20 mg/mL injection 10 mg  10 mg IntraVENous Q6H PRN    0.9% sodium chloride infusion  125 mL/hr IntraVENous CONTINUOUS    aztreonam (AZACTAM) 1 g in 0.9% sodium chloride (MBP/ADV) 100 mL  1 g IntraVENous Q8H    phenol throat spray (CHLORASEPTIC) 1 Spray  1 Spray Oral PRN     Objective:     Vitals:    06/28/19 0530 06/28/19 0600 06/28/19 0630 06/28/19 1254   BP: 109/79 105/72 124/83 99/65   Pulse: 79 83 76 70   Resp:  18  16   Temp:  98.7 °F (37.1 °C)  97.1 °F (36.2 °C)   SpO2: 94% 95% 94% 98%   Weight:       Height:         06/28 0701 - 06/28 1900  In: -   Out: 350   06/26 1901 - 06/28 0700  In: 1100 [I.V.:1100]  Out: -   Physical Exam:   Gen- the patient is well developed and in no acute distress  HEENT- PERRL, EOMI, no scleral icterus       nose without alar flaring or epistaxis                  oral muscosa moist without cyanosis  Neck- no JVD or retractions  Lungs- resp even/unlab   Heart- RRR   Abd- diffuse ttp without rebound minimal guarding. Soft. Ext- warm without cyanosis. There is no lower leg edema. Skin- no jaundice or rashes  Neuro- alert and oriented x 3. No gross sensorimotor deficits are present.      Data Review   Recent Labs     06/27/19 2059   WBC 15.4*   HGB 15.7*   HCT 49.0*        Recent Labs     06/28/19  1103 06/27/19 2059    138   K 4.3 4.3    101   CO2 28 33*   * 114*   BUN 16 17   CREA 0.89 0.85       Assessment:     Hospital Problems  Never Reviewed          Codes Class Noted POA    Terminal ileitis ([facility] ICD-10-CM: K50.00  ICD-9-CM: 555.0  6/28/2019 Yes        * (Principal) SBO (small bowel obstruction) ([facility] ICD-10-CM: H79.645  ICD-9-CM: 560.9  6/28/2019 Yes        Leukocytosis ICD-10-CM: F77.868  ICD-9-CM: 288.60  6/28/2019 Yes            Plan:   Nonsurgical therapy at this time. Agree with GI consult and NG tube decompression. Will defer medical management of ileitis to GI. Will sign off. If SBO does not improve will be happy to reconsult.       Otyuri Dumont Phillip, DO

## 2019-06-28 NOTE — CONSULTS
[address] [facility]    Name:  Radha Meza  MR#:  [id number]  :  1965  ACCOUNT #:  [de-identified]  DATE OF SERVICE:  2019    REASON FOR CONSULTATION:  Small bowel obstruction associated with terminal ileitis. SUBJECTIVE:  This 61-year-old female was seen in consultation early this morning because of abdominal pain, nausea, and vomiting and an abnormal CT scan of the abdomen and pelvis. The patient states that she is chronically constipated. However, over the last 3 to 4 days, her constipation has become worse and has been associated with abdominal pain with nausea and vomiting. Because of those symptoms, the patient was seen in the ER at [facility]. Lab work was obtained, which showed leukocytosis with a white count of 15,400. Abdominal series was performed, which revealed a prominent loop of small bowel in the mid abdomen - ileus versus obstruction. As a result, CT of the abdomen and pelvis was performed. The CT scan showed thickened, edematous, and hyperenhancing mucosa of the terminal ileum with associated small bowel obstruction. As a result, consultation has been placed. The patient denies any chronic diarrhea. She does have abdominal discomfort \"at times\" with constipation. The patient has had JORGE-BSO in the past.  No other abdominal surgery has been performed. The patient denies any recent foreign travel, history of exposure to TB, ingestion of any unusual food, rectal bleeding, fevers or chills. Because of the above-mentioned abnormal CT scan, a referral has been made. The patient denies any prior history of colonoscopy. PAST MEDICAL HISTORY:  Status post JORGE-BSO.    MEDICATIONS ON ADMISSION:  None. ALLERGIES:  1. LATEX. 2.  CODEINE. 3.  SULFA. HABITS:  Alcohol - none. Smoking - one pack per day. FAMILY HISTORY:  Positive for ovarian cancer, positive for colon cancer (grandparent).     REVIEW OF SYSTEMS:  HEENT:  Negative for headache or blurry vision. CHEST:  Negative for cough, sputum production, hemoptysis. CARDIOVASCULAR:  Negative for angina, PND, or orthopnea. ABDOMEN:  Positive for abdominal pain associated with nausea, vomiting, and constipation. No history of liver disease. GENITOURINARY:  Negative for hematuria, frequency, and dysuria. SKIN:  Negative for rash or itching. MUSCULOSKELETAL:  Negative for joint swelling or redness. PSYCH:  Negative for anxiety or depression. PHYSICAL EXAMINATION:  GENERAL:  The patient is a pleasant, lethargic, middle-aged white female, in no significant distress. NG tube is in place (I should mention that the patient did receive medications prior to my seeing her because of anxiety associated with the NG tube). VITAL SIGNS:  Include temperature of 97.7, pulse 83, respirations 17, blood pressure 166/93. HEENT:  Conjunctivae not pale. Sclerae grossly anicteric. Oropharynx revealed no oral lesions. RESPIRATORY:  Chest reveals no audible wheeze. CARDIOVASCULAR:  Regular rate and rhythm without any murmurs, gallops or rubs. ABDOMEN:  Soft. Bowel sounds are hypoactive. There are no masses. There is tenderness to palpation, moderate to severe in the right lower quadrant diffusely, but more impressive in the right lower quadrant. There is no organomegaly. RECTAL:  Deferred. EXTREMITIES:  No edema. Pulses 2+. NEUROLOGIC:  The patient is grossly intact    LABORATORY DATA:  CBC:  Hemoglobin 15.7, hematocrit 49.0, white count 15,400, platelet count 188,870. Differential on the white count includes 65 segs, 22 lymphs, 6 monos, 6 eosinophils, and 1 immature granulocyte. Electrolytes revealed a BUN of 17, creatinine 0.85, bicarb was elevated at 33. Liver panel was unremarkable. Abdominal films as described above. CT of the abdomen and pelvis - as described above. IMPRESSION:  1.   Abdominal pain with constipation with abnormal CT scan suggestive of small bowel obstruction. 2.  Status post total abdominal hysterectomy and bilateral salpingo-oophorectomy. 3.  Substance abuse - nicotine. 4.  Multiple allergies as described above. PLAN:  DIAGNOSTICS:  1. Stool studies if possible. 2.  QuantiFERON Gold. 3.  Colonoscopy, if possible, after prep per rectum. THERAPEUTIC:  1.  Nasogastric suction as currently ordered. 2.  Intravenous fluids. 3.  Intravenous antibiotics including intravenous Flagyl for now. 4.  Steroids after evaluation for tuberculosis is negative (QuantiFERON Gold) and hopefully after stool studies are requested). 5.  Further recommendations pending results of above. 6.  Dulcolax suppositories.       Vale Ibarra MD      [id number]  D:  06/28/2019 3:04  T:  06/28/2019 9:17  JOB #:  [id number]  CC:  Kameron Mora MD

## 2019-06-28 NOTE — PROGRESS NOTES
GI DAILY PROGRESS NOTE    Admit Date:  6/27/2019    Today's Date:  6/28/2019    CC:  Abnormal CT, N/V, constipation    Subjective:     Patient's NGT came out today by accident. +nausea. Negative emesis. Continued generalized abdominal pain. Had small BM today. +flatus. Abdomen is less distended, negative f/c. Patient reports 3 year history increased abdominal pain and bloating with any meals containing leafy vegetables, etc. At baseline can go more than a week without a bowel movement or can have several small stools per day with incomplete evacuation of stool. NEgative rectal bleeding/melena. Negative fevers/chills. Medications:   Current Facility-Administered Medications   Medication Dose Route Frequency    sodium chloride (NS) flush 5-40 mL  5-40 mL IntraVENous Q8H    sodium chloride (NS) flush 5-40 mL  5-40 mL IntraVENous PRN    diphenhydrAMINE (BENADRYL) injection 12.5 mg  12.5 mg IntraVENous Q4H PRN    ondansetron (ZOFRAN) injection 4 mg  4 mg IntraVENous Q4H PRN    acetaminophen (TYLENOL) solution 650 mg  650 mg Oral Q4H PRN    morphine injection 2 mg  2 mg IntraVENous Q3H PRN    naloxone (NARCAN) injection 0.4 mg  0.4 mg IntraVENous PRN    famotidine (PF) (PEPCID) 20 mg in sodium chloride 0.9% 10 mL injection  20 mg IntraVENous Q12H    bisacodyl (DULCOLAX) suppository 10 mg  10 mg Rectal DAILY    metroNIDAZOLE (FLAGYL) IVPB premix 250 mg  250 mg IntraVENous Q8H    hydrALAZINE (APRESOLINE) 20 mg/mL injection 10 mg  10 mg IntraVENous Q6H PRN    0.9% sodium chloride infusion  125 mL/hr IntraVENous CONTINUOUS    aztreonam (AZACTAM) 1 g in 0.9% sodium chloride (MBP/ADV) 100 mL  1 g IntraVENous Q8H    phenol throat spray (CHLORASEPTIC) 1 Spray  1 Spray Oral PRN       Review of Systems:  ROS was obtained, with pertinent positives as listed above. No chest pain or SOB.     Diet:  NPO    Objective:   Vitals:  Visit Vitals  /75 (BP 1 Location: Left arm, BP Patient Position: At rest)   Pulse (!) 58 Comment: rn notified   Temp 98.3 °F (36.8 °C)   Resp 16   Ht 5' 6\" (1.676 m)   Wt 52.6 kg (116 lb)   SpO2 98%   BMI 18.72 kg/m²     Intake/Output:  06/28 0701 - 06/28 1900  In: -   Out: 350   06/26 1901 - 06/28 0700  In: 1100 [I.V.:1100]  Out: -   Exam:  General appearance: NAD  HEENT: anicteric sclera, mmm  Lungs: clear to auscultation bilaterally anteriorly  Heart: regular rate and rhythm  Abdomen: soft, minimal distention, light bowel sounds, minimal periumbilical tenderness, neg rebound/guarding  Neuro:  alert and orientedx3    Data Review (Labs):    Recent Labs     06/28/19  1103 06/27/19 2059   WBC  --  15.4*   HGB  --  15.7*   HCT  --  49.0*   PLT  --  363   MCV  --  95.1    138   K 4.3 4.3    101   CO2 28 33*   BUN 16 17   CREA 0.89 0.85   CA 8.6 9.8   * 114*   AP  --  88   SGOT  --  22   ALB  --  4.3   TP  --  7.8       Assessment:     Principal Problem:    SBO (small bowel obstruction) ([facility] (6/28/2019)    Active Problems:    Terminal ileitis ([facility] (6/28/2019)      Leukocytosis (6/28/2019)      1. Abdominal pain  2. Ileitis with partial SBO  3. Chronic constipation  4. Leukocytosis      Plan:     1. NGT came out today. Hold NGT for now unless she develops significant emesis. 2. Continue NPO status. IVF. 3. Tap water enema this PM.  4. KUB in AM  5. Based on poor tolerance of high residue diet, I suspect ileitis is chronic and this most likely represents Crohn's disease. OK to continue abx for now. Will add budesonide once can tolerate PO intake. 6. Will consider timing of colonoscopy once patient can tolerate clear liquid diet.     Will follow    Adarsh Rios MD

## 2019-06-28 NOTE — ED PROVIDER NOTES
HPI:  48 female here with complaint of constipation. Diffuse abdominal pain. Has not had a bowel movement for 3 days. Prior total hysterectomy. Still has her gallbladder. She thinks her appendix has been removed with her previous hysterectomy. Denies any fever. Positive vomiting. No recent antibiotic. No travel. No urinary pain. No weakness tingling or numbness. ROS  Constitutional: No fever, no chills  Skin: no rash  Eye: No vision changes  ENMT:   Respiratory: No shortness of breath, no cough  Cardiovascular: No chest pain, no palpitations  Gastrointestinal: + vomiting, + nausea, no diarrhea, + abdominal pain  : No dysuria  MSK: No back pain, no muscle pain, no joint pain  Neuro: No headache, no change in mental status, no numbness, no tingling, no weakness  Psych:   Endocrine:   All other review of systems positive per history of present illness and the above otherwise negative or noncontributory. Visit Vitals  BP (!) 171/119   Pulse 80   Temp 97.7 °F (36.5 °C)   Resp 17   Ht 5' 6\" (1.676 m)   Wt 52.6 kg (116 lb)   SpO2 97%   BMI 18.72 kg/m²     History reviewed. No pertinent past medical history. Past Surgical History:   Procedure Laterality Date    HX GYN      hysterectomy     None         Adult Exam   General: alert, no acute distress  Head: normocephalic, atraumatic  ENT: moist mucous membranes  Neck: supple, non-tender; full range of motion  Cardiovascular: regular rate and rhythm, normal peripheral perfusion, no edema  Respiratory:  normal respirations; no wheezing, rales or rhonchi  Gastrointestinal: mildly distended. Hypoactive bowel sounds. Diffuse tenderness throughout the abdomen. Back: non-tender, full range of motion  Musculoskeletal: normal range of motion, normal strength, no gross deformities  Neurological: alert and oriented x 4, no gross focal deficits; normal speech  Psychiatric: cooperative; appropriate mood and affect    MDM:  Diffuse abdominal pain.   Diminished bowel sounds. Concern for obstructions. Prior total hysterectomy. X-ray obtained with prominent bowel loops in the mid abdomen. We'll need to follow-up with CT abdomen and pelvis with IV and oral contrast.  Patient is to remain nothing by mouth otherwise. Labs with leukocytosis of 15.4. She is afebrile here. Patient will be signed out to oncoming physician pending CT abdomen and pelvis buffered assessment of diffuse abdominal pain with suspicion for bowel obstruction       Xr Abd (ap And Erect Or Decub)    Result Date: 6/27/2019  ACUTE ABDOMINAL SERIES, 3 VIEWS. HISTORY: Abdominal pain. TECHNIQUE: AP view of the chest, flat and upright views of the abdomen on a total of 3 images. COMPARISON: None. FINDINGS: Prominent loop of small bowel in the midabdomen. No evidence of free air, organomegaly, abnormal calcifications or ascites. IMPRESSION: Prominent loop of small bowel mid abdomen. Ileus versus obstruction. Recent Results (from the past 24 hour(s))   CBC WITH AUTOMATED DIFF    Collection Time: 06/27/19  8:59 PM   Result Value Ref Range    WBC 15.4 (H) 4.3 - 11.1 K/uL    RBC 5.15 4.05 - 5.2 M/uL    HGB 15.7 (H) 11.7 - 15.4 g/dL    HCT 49.0 (H) 35.8 - 46.3 %    MCV 95.1 79.6 - 97.8 FL    MCH 30.5 26.1 - 32.9 PG    MCHC 32.0 31.4 - 35.0 g/dL    RDW 14.0 11.9 - 14.6 %    PLATELET 719 322 - 651 K/uL    MPV 10.1 9.4 - 12.3 FL    ABSOLUTE NRBC 0.00 0.0 - 0.2 K/uL    DF AUTOMATED      NEUTROPHILS 65 43 - 78 %    LYMPHOCYTES 22 13 - 44 %    MONOCYTES 6 4.0 - 12.0 %    EOSINOPHILS 6 0.5 - 7.8 %    BASOPHILS 0 0.0 - 2.0 %    IMMATURE GRANULOCYTES 1 0.0 - 5.0 %    ABS. NEUTROPHILS 10.1 (H) 1.7 - 8.2 K/UL    ABS. LYMPHOCYTES 3.5 0.5 - 4.6 K/UL    ABS. MONOCYTES 0.9 0.1 - 1.3 K/UL    ABS. EOSINOPHILS 0.9 (H) 0.0 - 0.8 K/UL    ABS. BASOPHILS 0.1 0.0 - 0.2 K/UL    ABS. IMM.  GRANS. 0.1 0.0 - 0.5 K/UL   METABOLIC PANEL, COMPREHENSIVE    Collection Time: 06/27/19  8:59 PM   Result Value Ref Range    Sodium 138 136 - 145 mmol/L    Potassium 4.3 3.5 - 5.1 mmol/L    Chloride 101 98 - 107 mmol/L    CO2 33 (H) 21 - 32 mmol/L    Anion gap 4 (L) 7 - 16 mmol/L    Glucose 114 (H) 65 - 100 mg/dL    BUN 17 6 - 23 MG/DL    Creatinine 0.85 0.6 - 1.0 MG/DL    GFR est AA >60 >60 ml/min/1.73m2    GFR est non-AA >60 >60 ml/min/1.73m2    Calcium 9.8 8.3 - 10.4 MG/DL    Bilirubin, total 0.3 0.2 - 1.1 MG/DL    ALT (SGPT) 44 12 - 65 U/L    AST (SGOT) 22 15 - 37 U/L    Alk. phosphatase 88 50 - 136 U/L    Protein, total 7.8 6.3 - 8.2 g/dL    Albumin 4.3 3.5 - 5.0 g/dL    Globulin 3.5 2.3 - 3.5 g/dL    A-G Ratio 1.2 1.2 - 3.5           Dragon voice recognition software was used to create this note. Although the note has been reviewed and corrected where necessary, additional errors may have been overlooked and remain in the text.

## 2019-06-28 NOTE — ED NOTES
Patient called to use the bedpan but she was unsuccessful. Patient had suppository  and she has not had any BM as at now. Will continue to monitor.

## 2019-06-28 NOTE — ED NOTES
TRANSFER - OUT REPORT:    Verbal report given to Clarke Patel RN(name) on Liliana Marquezbourne  being transferred to [facility]) for routine progression of care       Report consisted of patients Situation, Background, Assessment and   Recommendations(SBAR). Information from the following report(s) ED Summary was reviewed with the receiving nurse. Lines:   Peripheral IV 06/27/19 Right Antecubital (Active)   Site Assessment Clean, dry, & intact 6/27/2019  9:02 PM   Phlebitis Assessment 0 6/27/2019  9:02 PM   Infiltration Assessment 0 6/27/2019  9:02 PM        Opportunity for questions and clarification was provided.       Patient transported with:   Transport

## 2019-06-28 NOTE — PROGRESS NOTES
Visited with patient as no PCP listed in chart. Demographics on face sheet verified. Patient states she lives at home with her mother. States no PCP and no insurance. Explained the [address] program in detail and provided patient with resources. Contact information for [facility] with [address] given to patient and patient encouraged to follow up with her as soon as possible. Patient also given contact information for Mumtaz Ferrer with [facility] and encouraged to call to get screened for Medicaid/Insurance eligibility and/or financial assistance.

## 2019-06-28 NOTE — ED NOTES
1:33 AM    Assumed care patient with CT scan of abdomen and pelvis pending. CT scan of abdomen and pelvis shows terminal ileitis resulting in small bowel obstruction. Possibility of Crohn's disease, colitis as well as stricture. Case discussed with general surgery Dr. Augustus Louis who did review CT scan.    He suggested discussing case with gastroenterology    2:04 AM  Case discussed with gastroenterology, will have the hospitalist admit patient here, NG tube will be placed, broad-spectrum antibiotics, GI will see patient in consult

## 2019-06-28 NOTE — H&P (VIEW-ONLY)
[address] [address] CONSULTATION Name:  Raúl Singh 
MR#:  [id number] :  1965 ACCOUNT #:  [de-identified] DATE OF SERVICE:  2019 REASON FOR CONSULTATION:  Small bowel obstruction associated with terminal ileitis. SUBJECTIVE:  This 61-year-old female was seen in consultation early this morning because of abdominal pain, nausea, and vomiting and an abnormal CT scan of the abdomen and pelvis. The patient states that she is chronically constipated. However, over the last 3 to 4 days, her constipation has become worse and has been associated with abdominal pain with nausea and vomiting. Because of those symptoms, the patient was seen in the ER at [facility]. Lab work was obtained, which showed leukocytosis with a white count of 15,400. Abdominal series was performed, which revealed a prominent loop of small bowel in the mid abdomen - ileus versus obstruction. As a result, CT of the abdomen and pelvis was performed. The CT scan showed thickened, edematous, and hyperenhancing mucosa of the terminal ileum with associated small bowel obstruction. As a result, consultation has been placed. The patient denies any chronic diarrhea. She does have abdominal discomfort \"at times\" with constipation. The patient has had JORGE-BSO in the past.  No other abdominal surgery has been performed. The patient denies any recent foreign travel, history of exposure to TB, ingestion of any unusual food, rectal bleeding, fevers or chills. Because of the above-mentioned abnormal CT scan, a referral has been made. The patient denies any prior history of colonoscopy. PAST MEDICAL HISTORY:  Status post JORGE-BSO. 
 
MEDICATIONS ON ADMISSION:  None. ALLERGIES: 
1. LATEX. 2.  CODEINE. 3.  SULFA. HABITS:  Alcohol - none. Smoking - one pack per day. FAMILY HISTORY:  Positive for ovarian cancer, positive for colon cancer (grandparent).  
 
REVIEW OF SYSTEMS: 
 HEENT:  Negative for headache or blurry vision. CHEST:  Negative for cough, sputum production, hemoptysis. CARDIOVASCULAR:  Negative for angina, PND, or orthopnea. ABDOMEN:  Positive for abdominal pain associated with nausea, vomiting, and constipation. No history of liver disease. GENITOURINARY:  Negative for hematuria, frequency, and dysuria. SKIN:  Negative for rash or itching. MUSCULOSKELETAL:  Negative for joint swelling or redness. PSYCH:  Negative for anxiety or depression. PHYSICAL EXAMINATION: 
GENERAL:  The patient is a pleasant, lethargic, middle-aged white female, in no significant distress. NG tube is in place (I should mention that the patient did receive medications prior to my seeing her because of anxiety associated with the NG tube). VITAL SIGNS:  Include temperature of 97.7, pulse 83, respirations 17, blood pressure 166/93. HEENT:  Conjunctivae not pale. Sclerae grossly anicteric. Oropharynx revealed no oral lesions. RESPIRATORY:  Chest reveals no audible wheeze. CARDIOVASCULAR:  Regular rate and rhythm without any murmurs, gallops or rubs. ABDOMEN:  Soft. Bowel sounds are hypoactive. There are no masses. There is tenderness to palpation, moderate to severe in the right lower quadrant diffusely, but more impressive in the right lower quadrant. There is no organomegaly. RECTAL:  Deferred. EXTREMITIES:  No edema. Pulses 2+. NEUROLOGIC:  The patient is grossly intact LABORATORY DATA:  CBC:  Hemoglobin 15.7, hematocrit 49.0, white count 15,400, platelet count 217,097. Differential on the white count includes 65 segs, 22 lymphs, 6 monos, 6 eosinophils, and 1 immature granulocyte. Electrolytes revealed a BUN of 17, creatinine 0.85, bicarb was elevated at 33. Liver panel was unremarkable. Abdominal films as described above. CT of the abdomen and pelvis - as described above. IMPRESSION: 
1.   Abdominal pain with constipation with abnormal CT scan suggestive of small bowel obstruction. 2.  Status post total abdominal hysterectomy and bilateral salpingo-oophorectomy. 3.  Substance abuse - nicotine. 4.  Multiple allergies as described above. PLAN: 
DIAGNOSTICS: 
1. Stool studies if possible. 2.  QuantiFERON Gold. 3.  Colonoscopy, if possible, after prep per rectum. THERAPEUTIC: 
1.  Nasogastric suction as currently ordered. 2.  Intravenous fluids. 3.  Intravenous antibiotics including intravenous Flagyl for now. 4.  Steroids after evaluation for tuberculosis is negative (QuantiFERON Gold) and hopefully after stool studies are requested). 5.  Further recommendations pending results of above. 6.  Dulcolax suppositories. Bettina Pyle MD 
 
 
[id number] 
D:  06/28/2019 3:04 
T:  06/28/2019 9:17 JOB #:  [id number] CC:  Daniel Flores MD

## 2019-06-28 NOTE — PROGRESS NOTES
TRANSFER - IN REPORT:    Verbal report received from [facility] on [place]  being received from ER for routine progression of care      Report consisted of patients Situation, Background, Assessment and   Recommendations(SBAR). Information from the following report(s) SBAR, ED Summary, Intake/Output, MAR and Recent Results was reviewed with the receiving nurse. Opportunity for questions and clarification was provided. Assessment completed upon patients arrival to unit and care assumed.

## 2019-06-29 ENCOUNTER — APPOINTMENT (OUTPATIENT)
Dept: GENERAL RADIOLOGY | Age: 54
DRG: 390 | End: 2019-06-29
Attending: INTERNAL MEDICINE
Payer: SELF-PAY

## 2019-06-29 LAB
ANION GAP SERPL CALC-SCNC: 5 MMOL/L (ref 7–16)
BASOPHILS # BLD: 0 K/UL (ref 0–0.2)
BASOPHILS NFR BLD: 0 % (ref 0–2)
BUN SERPL-MCNC: 13 MG/DL (ref 6–23)
CALCIUM SERPL-MCNC: 8 MG/DL (ref 8.3–10.4)
CHLORIDE SERPL-SCNC: 107 MMOL/L (ref 98–107)
CO2 SERPL-SCNC: 29 MMOL/L (ref 21–32)
CREAT SERPL-MCNC: 0.77 MG/DL (ref 0.6–1)
DIFFERENTIAL METHOD BLD: ABNORMAL
EOSINOPHIL # BLD: 0.6 K/UL (ref 0–0.8)
EOSINOPHIL NFR BLD: 7 % (ref 0.5–7.8)
ERYTHROCYTE [DISTWIDTH] IN BLOOD BY AUTOMATED COUNT: 14.2 % (ref 11.9–14.6)
GLUCOSE SERPL-MCNC: 100 MG/DL (ref 65–100)
HCT VFR BLD AUTO: 37.2 % (ref 35.8–46.3)
HGB BLD-MCNC: 11.8 G/DL (ref 11.7–15.4)
IMM GRANULOCYTES # BLD AUTO: 0 K/UL (ref 0–0.5)
IMM GRANULOCYTES NFR BLD AUTO: 0 % (ref 0–5)
LYMPHOCYTES # BLD: 4.1 K/UL (ref 0.5–4.6)
LYMPHOCYTES NFR BLD: 48 % (ref 13–44)
MAGNESIUM SERPL-MCNC: 2.4 MG/DL (ref 1.8–2.4)
MCH RBC QN AUTO: 30.5 PG (ref 26.1–32.9)
MCHC RBC AUTO-ENTMCNC: 31.7 G/DL (ref 31.4–35)
MCV RBC AUTO: 96.1 FL (ref 79.6–97.8)
MONOCYTES # BLD: 0.6 K/UL (ref 0.1–1.3)
MONOCYTES NFR BLD: 7 % (ref 4–12)
NEUTS SEG # BLD: 3.2 K/UL (ref 1.7–8.2)
NEUTS SEG NFR BLD: 37 % (ref 43–78)
NRBC # BLD: 0 K/UL (ref 0–0.2)
PLATELET # BLD AUTO: 268 K/UL (ref 150–450)
PMV BLD AUTO: 10.2 FL (ref 9.4–12.3)
POTASSIUM SERPL-SCNC: 3.8 MMOL/L (ref 3.5–5.1)
RBC # BLD AUTO: 3.87 M/UL (ref 4.05–5.2)
SODIUM SERPL-SCNC: 141 MMOL/L (ref 136–145)
WBC # BLD AUTO: 8.6 K/UL (ref 4.3–11.1)

## 2019-06-29 PROCEDURE — 74018 RADEX ABDOMEN 1 VIEW: CPT

## 2019-06-29 PROCEDURE — 74011250636 HC RX REV CODE- 250/636: Performed by: HOSPITALIST

## 2019-06-29 PROCEDURE — 77030020263 HC SOL INJ SOD CL0.9% LFCR 1000ML

## 2019-06-29 PROCEDURE — 74011250636 HC RX REV CODE- 250/636: Performed by: INTERNAL MEDICINE

## 2019-06-29 PROCEDURE — 74011250637 HC RX REV CODE- 250/637: Performed by: INTERNAL MEDICINE

## 2019-06-29 PROCEDURE — 36415 COLL VENOUS BLD VENIPUNCTURE: CPT

## 2019-06-29 PROCEDURE — 80048 BASIC METABOLIC PNL TOTAL CA: CPT

## 2019-06-29 PROCEDURE — 74011000258 HC RX REV CODE- 258: Performed by: INTERNAL MEDICINE

## 2019-06-29 PROCEDURE — 74011000250 HC RX REV CODE- 250: Performed by: HOSPITALIST

## 2019-06-29 PROCEDURE — 65270000029 HC RM PRIVATE

## 2019-06-29 PROCEDURE — 83735 ASSAY OF MAGNESIUM: CPT

## 2019-06-29 PROCEDURE — 85025 COMPLETE CBC W/AUTO DIFF WBC: CPT

## 2019-06-29 RX ORDER — BUDESONIDE 3 MG/1
9 CAPSULE, COATED PELLETS ORAL DAILY
Status: DISCONTINUED | OUTPATIENT
Start: 2019-06-29 | End: 2019-06-29

## 2019-06-29 RX ADMIN — AZTREONAM 1 G: 1 INJECTION, POWDER, LYOPHILIZED, FOR SOLUTION INTRAMUSCULAR; INTRAVENOUS at 15:04

## 2019-06-29 RX ADMIN — BISACODYL 10 MG: 10 SUPPOSITORY RECTAL at 09:19

## 2019-06-29 RX ADMIN — SODIUM CHLORIDE 125 ML/HR: 900 INJECTION, SOLUTION INTRAVENOUS at 06:05

## 2019-06-29 RX ADMIN — AZTREONAM 1 G: 1 INJECTION, POWDER, LYOPHILIZED, FOR SOLUTION INTRAMUSCULAR; INTRAVENOUS at 05:00

## 2019-06-29 RX ADMIN — FAMOTIDINE 20 MG: 10 INJECTION, SOLUTION INTRAVENOUS at 09:19

## 2019-06-29 RX ADMIN — METHYLPREDNISOLONE SODIUM SUCCINATE 20 MG: 40 INJECTION, POWDER, FOR SOLUTION INTRAMUSCULAR; INTRAVENOUS at 20:44

## 2019-06-29 RX ADMIN — METRONIDAZOLE 250 MG: 500 INJECTION, SOLUTION INTRAVENOUS at 05:00

## 2019-06-29 RX ADMIN — SODIUM CHLORIDE 125 ML/HR: 900 INJECTION, SOLUTION INTRAVENOUS at 17:35

## 2019-06-29 RX ADMIN — METRONIDAZOLE 250 MG: 500 INJECTION, SOLUTION INTRAVENOUS at 13:41

## 2019-06-29 RX ADMIN — FAMOTIDINE 20 MG: 10 INJECTION, SOLUTION INTRAVENOUS at 20:41

## 2019-06-29 RX ADMIN — BUDESONIDE 9 MG: 3 CAPSULE ORAL at 11:13

## 2019-06-29 RX ADMIN — AZTREONAM 1 G: 1 INJECTION, POWDER, LYOPHILIZED, FOR SOLUTION INTRAMUSCULAR; INTRAVENOUS at 21:43

## 2019-06-29 RX ADMIN — Medication 10 ML: at 13:42
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
## 2019-06-29 NOTE — PROGRESS NOTES
SW reviewed patient's chart and a baseline assessment was conducted. Discharge plan at this time is as follows:     Care Management Interventions  PCP Verified by CM:  Yes  Mode of Transport at Discharge: Self  Transition of Care Consult (CM Consult): Discharge Planning  Current Support Network: Own Home  Confirm Follow Up Transport: Self  Plan discussed with Pt/Family/Caregiver: Yes  Freedom of Choice Offered: Yes  Discharge Location  Discharge Placement: Home with family assistance(Self-Pay resources provided )    Bala Shay, [address] Work   [address] [facility]  [email or web address]

## 2019-06-29 NOTE — PROGRESS NOTES
Tap water enema complete. Pt tolerated well. Tap water was the only output, no stool noted. Pt discouraged and was educated on the importance of possible NGT reinsertion tomorrow.

## 2019-06-29 NOTE — PROGRESS NOTES
Shift assessment complete. Pt resting in bed. A&Ox4. Respirations present, even, unlabored. Lung sounds clear to auscultation. HR regular, S1&S2 auscultated. IVF infusing without difficulty. Bed in lowest position, call light within reach, side rails x3. Encouraged to call for help when needed.

## 2019-06-29 NOTE — PROGRESS NOTES
GI DAILY PROGRESS NOTE    Admit Date:  6/27/2019    Today's Date:  6/29/2019    CC:  Ileitis, N/V, constipation    Subjective:     Patient with improvement in nausea symptoms. Abdominal distention and fullness stable overnight. +flatus. Negative BM. Medications:   Current Facility-Administered Medications   Medication Dose Route Frequency    methylPREDNISolone (PF) (SOLU-MEDROL) injection 20 mg  20 mg IntraVENous Q12H    sodium chloride (NS) flush 5-40 mL  5-40 mL IntraVENous Q8H    sodium chloride (NS) flush 5-40 mL  5-40 mL IntraVENous PRN    diphenhydrAMINE (BENADRYL) injection 12.5 mg  12.5 mg IntraVENous Q4H PRN    ondansetron (ZOFRAN) injection 4 mg  4 mg IntraVENous Q4H PRN    acetaminophen (TYLENOL) solution 650 mg  650 mg Oral Q4H PRN    morphine injection 2 mg  2 mg IntraVENous Q3H PRN    naloxone (NARCAN) injection 0.4 mg  0.4 mg IntraVENous PRN    famotidine (PF) (PEPCID) 20 mg in sodium chloride 0.9% 10 mL injection  20 mg IntraVENous Q12H    bisacodyl (DULCOLAX) suppository 10 mg  10 mg Rectal DAILY    metroNIDAZOLE (FLAGYL) IVPB premix 250 mg  250 mg IntraVENous Q8H    hydrALAZINE (APRESOLINE) 20 mg/mL injection 10 mg  10 mg IntraVENous Q6H PRN    0.9% sodium chloride infusion  125 mL/hr IntraVENous CONTINUOUS    aztreonam (AZACTAM) 1 g in 0.9% sodium chloride (MBP/ADV) 100 mL  1 g IntraVENous Q8H    phenol throat spray (CHLORASEPTIC) 1 Spray  1 Spray Oral PRN       Review of Systems:  ROS was obtained, with pertinent positives as listed above. No chest pain or SOB.     Diet:  NPO    Objective:   Vitals:  Visit Vitals  /62 (BP 1 Location: Right arm, BP Patient Position: At rest;Head of bed elevated (Comment degrees))   Pulse 76   Temp 97.7 °F (36.5 °C)   Resp 18   Ht 5' 6\" (1.676 m)   Wt 52.6 kg (116 lb)   SpO2 97%   BMI 18.72 kg/m²     Intake/Output:  06/29 0701 - 06/29 1900  In: -   Out: 5113 [RXLMB:1809]  06/27 1901 - 06/29 0700  In: 1100 [I.V.:1100]  Out: 350 Exam:  General appearance: NAD  HEENT: anicteric sclera, mmm  Lungs: clear to auscultation bilaterally anteriorly  Heart: regular rate and rhythm  Abdomen: soft, mild distention, NT, light BS  Neuro:  alert and oriented x3    Data Review (Labs):    Recent Labs     06/29/19  0353 06/28/19  1103 06/27/19  2059   WBC 8.6  --  15.4*   HGB 11.8  --  15.7*   HCT 37.2  --  49.0*     --  363   MCV 96.1  --  95.1    142 138   K 3.8 4.3 4.3    107 101   CO2 29 28 33*   BUN 13 16 17   CREA 0.77 0.89 0.85   CA 8.0* 8.6 9.8   MG 2.4  --   --     108* 114*   AP  --   --  88   SGOT  --   --  22   ALB  --   --  4.3   TP  --   --  7.8       Assessment:     Principal Problem:    SBO (small bowel obstruction) ([facility]) (6/28/2019)    Active Problems:    Terminal ileitis ([address] (6/28/2019)      Leukocytosis (6/28/2019)      1. Abdominal pain  2. Ileitis with partial SBO  3. Chronic constipation  4. Leukocytosis      Plan:     1. Continue NPO except ice chips/sips clear liquids. IVF. 2. Daily dulcolax suppository  3. Suspected CD of small bowel. Starting solumedrol. 4. OK to continue abx for now.     5. Will consider timing of colonoscopy once patient can tolerate clear liquid diet.     Will follow     Jeb hWite MD

## 2019-06-29 NOTE — PROGRESS NOTES
Progress Note    Patient: Brit Galeana MRN: [id number]  SSN: [id number]    YOB: 1965  Age: 48 y.o. Sex: female      Admit Date: 6/27/2019    LOS: 1 day     Subjective:     Admitted due to constipation and abdominal pain. Found to have small bowel obstruction with ileitis. Patient pulled NG tube and would not allow re-insertion. No fever. She is feeling better today. Objective:     Vitals:    06/28/19 2005 06/29/19 0015 06/29/19 0410 06/29/19 0754   BP: 102/65 106/68 103/59 118/79   Pulse: 61 (!) 58 67 60   Resp: 17 17 16 18   Temp: 97.9 °F (36.6 °C) 97.3 °F (36.3 °C) 97.9 °F (36.6 °C) 97.9 °F (36.6 °C)   SpO2: 95% 98% 94% 97%   Weight:       Height:            Intake and Output:  Current Shift: 06/29 0701 - 06/29 1900  In: -   Out: 500 [Urine:500]  Last three shifts: 06/27 1901 - 06/29 0700  In: 1100 [I.V.:1100]  Out: 350     Physical Exam:   General:                    The patient is a pleasant middle aged female in no acute respiratory distress. Talking. Head:                                   Normocephalic/atraumatic. Eyes:                                   No palpebral pallor or scleral icterus. ENT:                                    External auricular and nasal exam within normal limits. Mucous membranes are moist.  Neck:                                   Supple, non-tender, no JVD. Lungs:                       Clear to auscultation bilaterally without wheezes or crackles. No respiratory distress or accessory muscle use. Heart:                                  Regular rate and rhythm, without murmurs, rubs, or gallops. Abdomen:                  Soft, mildly tender, moderately distended with normoactive bowel sounds. Genitourinary:           No tenderness over the bladder or bilateral CVAs. Extremities:               Without clubbing, cyanosis, or edema.   Skin: Normal color, texture, and turgor. No rashes, lesions, or jaundice. Pulses:                      Radial and dorsalis pedis pulses present 2+ bilaterally. Capillary refill <2s. Neurologic:                CN II-XII grossly intact and symmetrical.                                               Moving all four extremities well with no focal deficits. Psychiatric:                Pleasant demeanor, appropriate affect. Alert and oriented x 3      Lab/Data Review:    Recent Results (from the past 24 hour(s))   METABOLIC PANEL, BASIC    Collection Time: 06/28/19 11:03 AM   Result Value Ref Range    Sodium 142 136 - 145 mmol/L    Potassium 4.3 3.5 - 5.1 mmol/L    Chloride 107 98 - 107 mmol/L    CO2 28 21 - 32 mmol/L    Anion gap 7 7 - 16 mmol/L    Glucose 108 (H) 65 - 100 mg/dL    BUN 16 6 - 23 MG/DL    Creatinine 0.89 0.6 - 1.0 MG/DL    GFR est AA >60 >60 ml/min/1.73m2    GFR est non-AA >60 >60 ml/min/1.73m2    Calcium 8.6 8.3 - 10.4 MG/DL   CBC WITH AUTOMATED DIFF    Collection Time: 06/29/19  3:53 AM   Result Value Ref Range    WBC 8.6 4.3 - 11.1 K/uL    RBC 3.87 (L) 4.05 - 5.2 M/uL    HGB 11.8 11.7 - 15.4 g/dL    HCT 37.2 35.8 - 46.3 %    MCV 96.1 79.6 - 97.8 FL    MCH 30.5 26.1 - 32.9 PG    MCHC 31.7 31.4 - 35.0 g/dL    RDW 14.2 11.9 - 14.6 %    PLATELET 878 380 - 324 K/uL    MPV 10.2 9.4 - 12.3 FL    ABSOLUTE NRBC 0.00 0.0 - 0.2 K/uL    DF AUTOMATED      NEUTROPHILS 37 (L) 43 - 78 %    LYMPHOCYTES 48 (H) 13 - 44 %    MONOCYTES 7 4.0 - 12.0 %    EOSINOPHILS 7 0.5 - 7.8 %    BASOPHILS 0 0.0 - 2.0 %    IMMATURE GRANULOCYTES 0 0.0 - 5.0 %    ABS. NEUTROPHILS 3.2 1.7 - 8.2 K/UL    ABS. LYMPHOCYTES 4.1 0.5 - 4.6 K/UL    ABS. MONOCYTES 0.6 0.1 - 1.3 K/UL    ABS. EOSINOPHILS 0.6 0.0 - 0.8 K/UL    ABS. BASOPHILS 0.0 0.0 - 0.2 K/UL    ABS. IMM.  GRANS. 0.0 0.0 - 0.5 K/UL   MAGNESIUM    Collection Time: 06/29/19  3:53 AM   Result Value Ref Range    Magnesium 2. 4 1.8 - 2.4 mg/dL   METABOLIC PANEL, BASIC    Collection Time: 06/29/19  3:53 AM   Result Value Ref Range    Sodium 141 136 - 145 mmol/L    Potassium 3.8 3.5 - 5.1 mmol/L    Chloride 107 98 - 107 mmol/L    CO2 29 21 - 32 mmol/L    Anion gap 5 (L) 7 - 16 mmol/L    Glucose 100 65 - 100 mg/dL    BUN 13 6 - 23 MG/DL    Creatinine 0.77 0.6 - 1.0 MG/DL    GFR est AA >60 >60 ml/min/1.73m2    GFR est non-AA >60 >60 ml/min/1.73m2    Calcium 8.0 (L) 8.3 - 10.4 MG/DL     CT abdomen and pelvis   6-  IMPRESSION:     Terminal ileitis resulting in a small bowel obstruction. If chronic this could  represent the presence of a stricture. Consider the possibility of infection  Crohn's disease in backwash ileitis from ulcerative colitis is the most likely  Possibilities. XR abdomen   6-  IMPRESSION: Prominent loop of small bowel mid abdomen.  Ileus versus obstruction.       Current Facility-Administered Medications:     budesonide (ENTOCORT EC) capsule 9 mg, 9 mg, Oral, DAILY, Dulce Cotton MD    sodium chloride (NS) flush 5-40 mL, 5-40 mL, IntraVENous, Q8H, Mikey Jimenez MD, 10 mL at 06/28/19 1334    sodium chloride (NS) flush 5-40 mL, 5-40 mL, IntraVENous, PRN, Mikey Jimenez MD    diphenhydrAMINE (BENADRYL) injection 12.5 mg, 12.5 mg, IntraVENous, Q4H PRN, Mikey Jimenez MD    ondansetron [facility]) injection 4 mg, 4 mg, IntraVENous, Q4H PRN, Mikey Jimenez MD, 4 mg at 06/28/19 1411    acetaminophen (TYLENOL) solution 650 mg, 650 mg, Oral, Q4H PRN, Mikey Jimenez MD    morphine injection 2 mg, 2 mg, IntraVENous, Q3H PRN, Mikey Jimenez MD, 2 mg at 06/28/19 1402    naloxone [facility]) injection 0.4 mg, 0.4 mg, IntraVENous, PRN, Mikey Jimenez MD    famotidine (PF) (PEPCID) 20 mg in sodium chloride 0.9% 10 mL injection, 20 mg, IntraVENous, Q12H, Mikey Jimenez MD, 20 mg at 06/29/19 0919    bisacodyl (DULCOLAX) suppository 10 mg, 10 mg, Rectal, DAILY, Elsy Weaver MD, 10 mg at 06/29/19 0919    metroNIDAZOLE (FLAGYL) IVPB premix 250 mg, 250 mg, IntraVENous, Q8H, Justice Weaver MD, Last Rate: 50 mL/hr at 06/29/19 0500, 250 mg at 06/29/19 0500    hydrALAZINE (APRESOLINE) 20 mg/mL injection 10 mg, 10 mg, IntraVENous, Q6H PRN, Griselda Dodson MD    0.9% sodium chloride infusion, 125 mL/hr, IntraVENous, CONTINUOUS, Rosemary Ventura MD, Last Rate: 125 mL/hr at 06/29/19 0605, 125 mL/hr at 06/29/19 0605    aztreonam (AZACTAM) 1 g in 0.9% sodium chloride (MBP/ADV) 100 mL, 1 g, IntraVENous, Q8H, Rosemary Ventura MD, Last Rate: 200 mL/hr at 06/29/19 0500, 1 g at 06/29/19 0500    phenol throat spray (CHLORASEPTIC) 1 Spray, 1 Spray, Oral, PRN, Rosemary Ventura MD      Assessment:     Principal Problem:    SBO (small bowel obstruction) ([facility] [address] (6/28/2019)    Active Problems:    Terminal ileitis ([facility] [address] (6/28/2019)      Leukocytosis (6/28/2019)        Plan:     Small bowel obstruction. ileitis  Continue NPO  IV fluid. KUB in AM.   Symptomatic treatments   GI is following. Plan for colonoscopy. Empiric IV antibiotics. I have discussed the plan of care with patient.       DVT prophylaxis : SCD       Signed By: Ruddy Garcia MD     June 29, 2019

## 2019-06-29 NOTE — PROGRESS NOTES
Problem: Falls - Risk of  Goal: *Absence of Falls  Description  Document Shania Payment Fall Risk and appropriate interventions in the flowsheet.   Outcome: Progressing Towards Goal     Problem: Small Bowel Obstruction: Day 2  Goal: Activity/Safety  Outcome: Progressing Towards Goal  Goal: Consults, if ordered  Outcome: Progressing Towards Goal  Goal: Diagnostic Test/Procedures  Outcome: Progressing Towards Goal  Goal: Nutrition/Diet  Outcome: Progressing Towards Goal  Goal: Discharge Planning  Outcome: Progressing Towards Goal  Goal: Medications  Outcome: Progressing Towards Goal  Goal: Respiratory  Outcome: Progressing Towards Goal  Goal: Treatments/Interventions/Procedures  Outcome: Progressing Towards Goal  Goal: Psychosocial  Outcome: Progressing Towards Goal  Goal: *Optimal pain control at patient's stated goal  Outcome: Progressing Towards Goal  Goal: *Adequate urinary output (equal to or greater than 30 milliliters/hour)  Outcome: Progressing Towards Goal  Goal: *Hemodynamically stable  Outcome: Progressing Towards Goal  Goal: *Demonstrates progressive activity  Outcome: Progressing Towards Goal  Goal: *Absence of nausea/vomiting  Outcome: Progressing Towards Goal  Goal: *Return of normal bowel function  Outcome: Progressing Towards Goal

## 2019-06-30 LAB
ANION GAP SERPL CALC-SCNC: 7 MMOL/L (ref 7–16)
BASOPHILS # BLD: 0 K/UL (ref 0–0.2)
BASOPHILS NFR BLD: 0 % (ref 0–2)
BUN SERPL-MCNC: 9 MG/DL (ref 6–23)
CALCIUM SERPL-MCNC: 8.3 MG/DL (ref 8.3–10.4)
CHLORIDE SERPL-SCNC: 109 MMOL/L (ref 98–107)
CO2 SERPL-SCNC: 26 MMOL/L (ref 21–32)
CREAT SERPL-MCNC: 0.66 MG/DL (ref 0.6–1)
DIFFERENTIAL METHOD BLD: ABNORMAL
EOSINOPHIL # BLD: 0.1 K/UL (ref 0–0.8)
EOSINOPHIL NFR BLD: 1 % (ref 0.5–7.8)
ERYTHROCYTE [DISTWIDTH] IN BLOOD BY AUTOMATED COUNT: 13.7 % (ref 11.9–14.6)
GLUCOSE SERPL-MCNC: 142 MG/DL (ref 65–100)
HCT VFR BLD AUTO: 37.3 % (ref 35.8–46.3)
HGB BLD-MCNC: 11.8 G/DL (ref 11.7–15.4)
IMM GRANULOCYTES # BLD AUTO: 0 K/UL (ref 0–0.5)
IMM GRANULOCYTES NFR BLD AUTO: 0 % (ref 0–5)
LYMPHOCYTES # BLD: 1.6 K/UL (ref 0.5–4.6)
LYMPHOCYTES NFR BLD: 24 % (ref 13–44)
MCH RBC QN AUTO: 30.3 PG (ref 26.1–32.9)
MCHC RBC AUTO-ENTMCNC: 31.6 G/DL (ref 31.4–35)
MCV RBC AUTO: 95.6 FL (ref 79.6–97.8)
MONOCYTES # BLD: 0.2 K/UL (ref 0.1–1.3)
MONOCYTES NFR BLD: 3 % (ref 4–12)
NEUTS SEG # BLD: 4.6 K/UL (ref 1.7–8.2)
NEUTS SEG NFR BLD: 71 % (ref 43–78)
NRBC # BLD: 0 K/UL (ref 0–0.2)
PLATELET # BLD AUTO: 269 K/UL (ref 150–450)
PMV BLD AUTO: 10.5 FL (ref 9.4–12.3)
POTASSIUM SERPL-SCNC: 3.8 MMOL/L (ref 3.5–5.1)
RBC # BLD AUTO: 3.9 M/UL (ref 4.05–5.2)
SODIUM SERPL-SCNC: 142 MMOL/L (ref 136–145)
WBC # BLD AUTO: 6.5 K/UL (ref 4.3–11.1)

## 2019-06-30 PROCEDURE — 74011250636 HC RX REV CODE- 250/636: Performed by: HOSPITALIST

## 2019-06-30 PROCEDURE — 87046 STOOL CULTR AEROBIC BACT EA: CPT

## 2019-06-30 PROCEDURE — 85025 COMPLETE CBC W/AUTO DIFF WBC: CPT

## 2019-06-30 PROCEDURE — 87045 FECES CULTURE AEROBIC BACT: CPT

## 2019-06-30 PROCEDURE — 74011250637 HC RX REV CODE- 250/637: Performed by: INTERNAL MEDICINE

## 2019-06-30 PROCEDURE — 74011250636 HC RX REV CODE- 250/636: Performed by: INTERNAL MEDICINE

## 2019-06-30 PROCEDURE — 80048 BASIC METABOLIC PNL TOTAL CA: CPT

## 2019-06-30 PROCEDURE — 83631 LACTOFERRIN FECAL (QUANT): CPT

## 2019-06-30 PROCEDURE — 74011000258 HC RX REV CODE- 258: Performed by: INTERNAL MEDICINE

## 2019-06-30 PROCEDURE — 77030020263 HC SOL INJ SOD CL0.9% LFCR 1000ML

## 2019-06-30 PROCEDURE — 65270000029 HC RM PRIVATE

## 2019-06-30 PROCEDURE — 87449 NOS EACH ORGANISM AG IA: CPT

## 2019-06-30 PROCEDURE — 74011000250 HC RX REV CODE- 250: Performed by: HOSPITALIST

## 2019-06-30 PROCEDURE — 36415 COLL VENOUS BLD VENIPUNCTURE: CPT

## 2019-06-30 RX ORDER — DOCUSATE SODIUM 100 MG/1
100 CAPSULE, LIQUID FILLED ORAL 2 TIMES DAILY
Status: DISCONTINUED | OUTPATIENT
Start: 2019-06-30 | End: 2019-07-03 | Stop reason: HOSPADM

## 2019-06-30 RX ADMIN — AZTREONAM 1 G: 1 INJECTION, POWDER, LYOPHILIZED, FOR SOLUTION INTRAMUSCULAR; INTRAVENOUS at 06:06

## 2019-06-30 RX ADMIN — METHYLPREDNISOLONE SODIUM SUCCINATE 20 MG: 40 INJECTION, POWDER, FOR SOLUTION INTRAMUSCULAR; INTRAVENOUS at 22:49

## 2019-06-30 RX ADMIN — FAMOTIDINE 20 MG: 10 INJECTION, SOLUTION INTRAVENOUS at 22:52

## 2019-06-30 RX ADMIN — METRONIDAZOLE 250 MG: 500 INJECTION, SOLUTION INTRAVENOUS at 08:57

## 2019-06-30 RX ADMIN — SODIUM CHLORIDE 125 ML/HR: 900 INJECTION, SOLUTION INTRAVENOUS at 04:06

## 2019-06-30 RX ADMIN — DOCUSATE SODIUM 100 MG: 100 CAPSULE, LIQUID FILLED ORAL at 16:07

## 2019-06-30 RX ADMIN — SODIUM CHLORIDE 125 ML/HR: 900 INJECTION, SOLUTION INTRAVENOUS at 17:42

## 2019-06-30 RX ADMIN — METRONIDAZOLE 250 MG: 500 INJECTION, SOLUTION INTRAVENOUS at 00:16

## 2019-06-30 RX ADMIN — METHYLPREDNISOLONE SODIUM SUCCINATE 20 MG: 40 INJECTION, POWDER, FOR SOLUTION INTRAMUSCULAR; INTRAVENOUS at 08:57

## 2019-06-30 RX ADMIN — FAMOTIDINE 20 MG: 10 INJECTION, SOLUTION INTRAVENOUS at 08:57

## 2019-06-30 RX ADMIN — BISACODYL 10 MG: 10 SUPPOSITORY RECTAL at 08:58

## 2019-06-30 RX ADMIN — Medication 10 ML: at 22:46

## 2019-06-30 NOTE — PROGRESS NOTES
Pt is offered pain med at this time but refused stated all she wants now is food. Explained to pt that she will not be able to receive food now as she is npo. Pt verbalizes understanding.

## 2019-06-30 NOTE — PROGRESS NOTES
GI DAILY PROGRESS NOTE    Admit Date:  6/27/2019    Today's Date:  6/30/2019    CC:  Ileitis    Subjective:     Patient has improved significantly on solumedrol. Reports abdominal fullness and nausea are 80% improved. BM x3 today that began as hard stool and now become softer. Negative rectal bleeding/melena. Medications:   Current Facility-Administered Medications   Medication Dose Route Frequency    methylPREDNISolone (PF) (SOLU-MEDROL) injection 20 mg  20 mg IntraVENous Q12H    sodium chloride (NS) flush 5-40 mL  5-40 mL IntraVENous Q8H    sodium chloride (NS) flush 5-40 mL  5-40 mL IntraVENous PRN    diphenhydrAMINE (BENADRYL) injection 12.5 mg  12.5 mg IntraVENous Q4H PRN    ondansetron (ZOFRAN) injection 4 mg  4 mg IntraVENous Q4H PRN    acetaminophen (TYLENOL) solution 650 mg  650 mg Oral Q4H PRN    morphine injection 2 mg  2 mg IntraVENous Q3H PRN    naloxone (NARCAN) injection 0.4 mg  0.4 mg IntraVENous PRN    famotidine (PF) (PEPCID) 20 mg in sodium chloride 0.9% 10 mL injection  20 mg IntraVENous Q12H    bisacodyl (DULCOLAX) suppository 10 mg  10 mg Rectal DAILY    hydrALAZINE (APRESOLINE) 20 mg/mL injection 10 mg  10 mg IntraVENous Q6H PRN    0.9% sodium chloride infusion  125 mL/hr IntraVENous CONTINUOUS    phenol throat spray (CHLORASEPTIC) 1 Spray  1 Spray Oral PRN       Review of Systems:  ROS was obtained, with pertinent positives as listed above. No chest pain or SOB. Diet:      Objective:   Vitals:  Visit Vitals  /81 (BP 1 Location: Left arm, BP Patient Position: At rest;Supine)   Pulse 63   Temp 98.6 °F (37 °C)   Resp 18   Ht 5' 6\" (1.676 m)   Wt 52.6 kg (116 lb)   SpO2 95%   BMI 18.72 kg/m²     Intake/Output:  No intake/output data recorded.   06/28 1901 - 06/30 0700  In: 3492 [I.V.:3492]  Out: 1100 [Urine:1100]  Exam:  General appearance: NAD  HEENT: anicteric sclera, mmm  Lungs: clear to auscultation bilaterally anteriorly  Heart: regular rate and rhythm  Abdomen: soft, NT, mild distention, +BS  Neuro:  alert and orientedx3  Data Review (Labs):    Recent Labs     06/30/19  0458 06/29/19  0353 06/28/19  1103 06/27/19 2059   WBC 6.5 8.6  --  15.4*   HGB 11.8 11.8  --  15.7*   HCT 37.3 37.2  --  49.0*    268  --  363   MCV 95.6 96.1  --  95.1    141 142 138   K 3.8 3.8 4.3 4.3   * 107 107 101   CO2 26 29 28 33*   BUN 9 13 16 17   CREA 0.66 0.77 0.89 0.85   CA 8.3 8.0* 8.6 9.8   MG  --  2.4  --   --    * 100 108* 114*   AP  --   --   --  88   SGOT  --   --   --  22   ALB  --   --   --  4.3   TP  --   --   --  7.8       Assessment:     Principal Problem:    SBO (small bowel obstruction) ([facility]) (6/28/2019)    Active Problems:    Terminal ileitis ([address] (6/28/2019)      Leukocytosis (6/28/2019)      Ron Olivas MD   Physician   Gastroenterology   Progress Notes   Signed   Date of Service:  06/29/19 1615                          []Hide copied text    []Javon for details      GI DAILY PROGRESS NOTE     Admit Date:  6/27/2019     Today's Date:  6/29/2019     CC:  Ileitis, N/V, constipation     Subjective:      Patient with improvement in nausea symptoms. Abdominal distention and fullness stable overnight. +flatus.  Negative BM.     Medications:          Current Facility-Administered Medications   Medication Dose Route Frequency    methylPREDNISolone (PF) (SOLU-MEDROL) injection 20 mg  20 mg IntraVENous Q12H    sodium chloride (NS) flush 5-40 mL  5-40 mL IntraVENous Q8H    sodium chloride (NS) flush 5-40 mL  5-40 mL IntraVENous PRN    diphenhydrAMINE (BENADRYL) injection 12.5 mg  12.5 mg IntraVENous Q4H PRN    ondansetron (ZOFRAN) injection 4 mg  4 mg IntraVENous Q4H PRN    acetaminophen (TYLENOL) solution 650 mg  650 mg Oral Q4H PRN    morphine injection 2 mg  2 mg IntraVENous Q3H PRN    naloxone (NARCAN) injection 0.4 mg  0.4 mg IntraVENous PRN    famotidine (PF) (PEPCID) 20 mg in sodium chloride 0.9% 10 mL injection  20 mg IntraVENous Q12H    bisacodyl (DULCOLAX) suppository 10 mg  10 mg Rectal DAILY    metroNIDAZOLE (FLAGYL) IVPB premix 250 mg  250 mg IntraVENous Q8H    hydrALAZINE (APRESOLINE) 20 mg/mL injection 10 mg  10 mg IntraVENous Q6H PRN    0.9% sodium chloride infusion  125 mL/hr IntraVENous CONTINUOUS    aztreonam (AZACTAM) 1 g in 0.9% sodium chloride (MBP/ADV) 100 mL  1 g IntraVENous Q8H    phenol throat spray (CHLORASEPTIC) 1 Spray  1 Spray Oral PRN         Review of Systems:  ROS was obtained, with pertinent positives as listed above. No chest pain or SOB.     Diet:  NPO     Objective:   Vitals:  Visit Vitals  /62 (BP 1 Location: Right arm, BP Patient Position: At rest;Head of bed elevated (Comment degrees))   Pulse 76   Temp 97.7 °F (36.5 °C)   Resp 18   Ht 5' 6\" (1.676 m)   Wt 52.6 kg (116 lb)   SpO2 97%   BMI 18.72 kg/m²      Intake/Output:  06/29 0701 - 06/29 1900  In: -   Out: 1100 [Urine:1100]  06/27 1901 - 06/29 0700  In: 1100 [I.V.:1100]  Out: 350   Exam:  General appearance: NAD  HEENT: anicteric sclera, mmm  Lungs: clear to auscultation bilaterally anteriorly  Heart: regular rate and rhythm  Abdomen: soft, mild distention, NT, light BS  Neuro:  alert and oriented x3     Data Review (Labs):          Recent Labs     06/29/19  0353 06/28/19  1103 06/27/19 2059   WBC 8.6  --  15.4*   HGB 11.8  --  15.7*   HCT 37.2  --  49.0*     --  363   MCV 96.1  --  95.1    142 138   K 3.8 4.3 4.3    107 101   CO2 29 28 33*   BUN 13 16 17   CREA 0.77 0.89 0.85   CA 8.0* 8.6 9.8   MG 2.4  --   --     108* 114*   AP  --   --  88   SGOT  --   --  22   ALB  --   --  4.3   TP  --   --  7.8         Assessment:      Principal Problem:    SBO (small bowel obstruction) ([facility]) (6/28/2019)     Active Problems:    Terminal ileitis ([facility]) (6/28/2019)       Leukocytosis (6/28/2019)        1. Abdominal pain  2. Ileitis with partial SBO  3. Chronic constipation  4. Leukocytosis        Plan:      1. Clear liquid diet.  Will not advance diet beyond this as planning colonoscopy Tuesday. 2. Daily dulcolax suppository  3. Suspected CD of small bowel. Continue solumedrol. If tolerates clear liquid diet today will plan for bowel prep on Monday for colonoscopy on Tuesday.   4. Discontinue abx    .     Will follow     Adarsh Rios MD

## 2019-06-30 NOTE — PROGRESS NOTES
Shift assessment completed via doc flow sheet. Patient is alert and oriented x 4. NAD. Lung sounds CTA bilaterally. Heart sounds S1, S2 auscultated and regular. Abdomen soft but  tender. Bowel sounds active to all 4 quadrants. IVF infusing without difficulty. Patient ambulates to bathroom as needed ad carolyn. Patient reported pain at 6/10 to abdomen and mouth. Bed is locked and in low position. Family at bedside. Bed rails x 3. Patient is encouraged to call for assistance. Call light within reach.

## 2019-06-30 NOTE — PROGRESS NOTES
Progress Note    Patient: Laina Lemos MRN: [id number]  SSN: [id number]    YOB: 1965  Age: 48 y.o. Sex: female      Admit Date: 6/27/2019    LOS: 2 days     Subjective:     Admitted due to constipation and abdominal pain. Found to have small bowel obstruction with ileitis. Patient is feeling better. Still with some abdominal pain from time to time. On antibiotics and IV Solumedrol. Objective:     Vitals:    06/29/19 1955 06/29/19 2341 06/30/19 0402 06/30/19 0816   BP: 140/68 138/74 115/77 103/68   Pulse: (!) 55 60 61 (!) 51   Resp: 18 17 18 18   Temp: 99.1 °F (37.3 °C) 98.8 °F (37.1 °C) 98 °F (36.7 °C) 98.1 °F (36.7 °C)   SpO2: 98% 98% 97% 98%   Weight:       Height:            Intake and Output:  Current Shift: No intake/output data recorded. Last three shifts: 06/28 1901 - 06/30 0700  In: 3492 [I.V.:3492]  Out: 1100 [Urine:1100]    Physical Exam:   General:                    The patient is a pleasant middle aged female in no acute respiratory distress. Talking. Resting in bed. Head:                                   Normocephalic/atraumatic. Eyes:                                   No palpebral pallor or scleral icterus. ENT:                                    External auricular and nasal exam within normal limits. Mucous membranes are moist.  Neck:                                   Supple, non-tender, no JVD. Lungs:                       Clear to auscultation bilaterally without wheezes or crackles. No respiratory distress or accessory muscle use. Heart:                                  Regular rate and rhythm, without murmurs, rubs, or gallops. Abdomen:                  Soft, mildly tender, moderately distended with normoactive bowel sounds. Genitourinary:           No tenderness over the bladder or bilateral CVAs.   Extremities:               Without clubbing, cyanosis, or edema.  Skin:                                    Normal color, texture, and turgor. No rashes, lesions, or jaundice. Pulses:                      Radial and dorsalis pedis pulses present 2+ bilaterally. Capillary refill <2s. Neurologic:                CN II-XII grossly intact and symmetrical.                                               Moving all four extremities well with no focal deficits. Psychiatric:                Pleasant demeanor, appropriate affect. Alert and oriented x 3      Lab/Data Review:    Recent Results (from the past 24 hour(s))   METABOLIC PANEL, BASIC    Collection Time: 06/30/19  4:58 AM   Result Value Ref Range    Sodium 142 136 - 145 mmol/L    Potassium 3.8 3.5 - 5.1 mmol/L    Chloride 109 (H) 98 - 107 mmol/L    CO2 26 21 - 32 mmol/L    Anion gap 7 7 - 16 mmol/L    Glucose 142 (H) 65 - 100 mg/dL    BUN 9 6 - 23 MG/DL    Creatinine 0.66 0.6 - 1.0 MG/DL    GFR est AA >60 >60 ml/min/1.73m2    GFR est non-AA >60 >60 ml/min/1.73m2    Calcium 8.3 8.3 - 10.4 MG/DL   CBC WITH AUTOMATED DIFF    Collection Time: 06/30/19  4:58 AM   Result Value Ref Range    WBC 6.5 4.3 - 11.1 K/uL    RBC 3.90 (L) 4.05 - 5.2 M/uL    HGB 11.8 11.7 - 15.4 g/dL    HCT 37.3 35.8 - 46.3 %    MCV 95.6 79.6 - 97.8 FL    MCH 30.3 26.1 - 32.9 PG    MCHC 31.6 31.4 - 35.0 g/dL    RDW 13.7 11.9 - 14.6 %    PLATELET 339 397 - 609 K/uL    MPV 10.5 9.4 - 12.3 FL    ABSOLUTE NRBC 0.00 0.0 - 0.2 K/uL    DF AUTOMATED      NEUTROPHILS 71 43 - 78 %    LYMPHOCYTES 24 13 - 44 %    MONOCYTES 3 (L) 4.0 - 12.0 %    EOSINOPHILS 1 0.5 - 7.8 %    BASOPHILS 0 0.0 - 2.0 %    IMMATURE GRANULOCYTES 0 0.0 - 5.0 %    ABS. NEUTROPHILS 4.6 1.7 - 8.2 K/UL    ABS. LYMPHOCYTES 1.6 0.5 - 4.6 K/UL    ABS. MONOCYTES 0.2 0.1 - 1.3 K/UL    ABS. EOSINOPHILS 0.1 0.0 - 0.8 K/UL    ABS. BASOPHILS 0.0 0.0 - 0.2 K/UL    ABS. IMM.  GRANS. 0.0 0.0 - 0.5 K/UL     CT abdomen and pelvis   6-  IMPRESSION:     Terminal ileitis resulting in a small bowel obstruction. If chronic this could  represent the presence of a stricture. Consider the possibility of infection  Crohn's disease in backwash ileitis from ulcerative colitis is the most likely  Possibilities. XR abdomen   6-  IMPRESSION: Prominent loop of small bowel mid abdomen.  Ileus versus obstruction.       Current Facility-Administered Medications:     methylPREDNISolone (PF) (SOLU-MEDROL) injection 20 mg, 20 mg, IntraVENous, Q12H, Grant Shetty MD, 20 mg at 06/29/19 2044    sodium chloride (NS) flush 5-40 mL, 5-40 mL, IntraVENous, Q8H, Jony Cordero MD, 10 mL at 06/29/19 1342    sodium chloride (NS) flush 5-40 mL, 5-40 mL, IntraVENous, PRN, Jony Cordero MD    diphenhydrAMINE (BENADRYL) injection 12.5 mg, 12.5 mg, IntraVENous, Q4H PRN, Jony Cordero MD    ondansetron [facility]) injection 4 mg, 4 mg, IntraVENous, Q4H PRN, Jony Cordero MD, 4 mg at 06/28/19 1411    acetaminophen (TYLENOL) solution 650 mg, 650 mg, Oral, Q4H PRN, Jony Cordero MD    morphine injection 2 mg, 2 mg, IntraVENous, Q3H PRN, Jony Cordero MD, 2 mg at 06/28/19 1402    naloxone [facility]) injection 0.4 mg, 0.4 mg, IntraVENous, PRN, Jony Cordero MD    famotidine (PF) (PEPCID) 20 mg in sodium chloride 0.9% 10 mL injection, 20 mg, IntraVENous, Q12H, Jony Cordero MD, 20 mg at 06/29/19 2041    bisacodyl (DULCOLAX) suppository 10 mg, 10 mg, Rectal, DAILY, Grady ALBRECHT MD, 10 mg at 06/29/19 0919    metroNIDAZOLE (FLAGYL) IVPB premix 250 mg, 250 mg, IntraVENous, Q8H, Justice Weaver MD, Last Rate: 50 mL/hr at 06/30/19 0016, 250 mg at 06/30/19 0016    hydrALAZINE (APRESOLINE) 20 mg/mL injection 10 mg, 10 mg, IntraVENous, Q6H PRN, Jony Cordero MD    0.9% sodium chloride infusion, 125 mL/hr, IntraVENous, CONTINUOUS, Rosemary Ventura MD, Last Rate: 125 mL/hr at 06/30/19 0406, 125 mL/hr at 06/30/19 0406    aztreonam (AZACTAM) 1 g in 0.9% sodium chloride (MBP/ADV) 100 mL, 1 g, IntraVENous, Q8H, Rosemary Ventura MD, Last Rate: 200 mL/hr at 06/30/19 0606, 1 g at 06/30/19 0606    phenol throat spray (CHLORASEPTIC) 1 Spray, 1 Spray, Oral, PRN, Rosemary Ventura MD      Assessment:     Principal Problem:    SBO (small bowel obstruction) ([facility] [address] (6/28/2019)    Active Problems:    Terminal ileitis ([facility] [address] (6/28/2019)      Leukocytosis (6/28/2019)        Plan:     Small bowel obstruction. ileitis  Continue NPO as per GI.   IV fluid. KUB in AM.   Symptomatic treatments   GI is following. Plan for colonoscopy later. Empiric IV antibiotics and IV Solumedrol. Will defer to GI as to decision of allowing PO intake. I have discussed the plan of care with patient.       DVT prophylaxis : SCD       Signed By: Mikey Barkley MD     June 30, 2019

## 2019-07-01 ENCOUNTER — APPOINTMENT (OUTPATIENT)
Dept: GENERAL RADIOLOGY | Age: 54
DRG: 390 | End: 2019-07-01
Attending: INTERNAL MEDICINE
Payer: SELF-PAY

## 2019-07-01 LAB — TROPONIN I SERPL-MCNC: <0.02 NG/ML (ref 0.02–0.05)

## 2019-07-01 PROCEDURE — 74011250637 HC RX REV CODE- 250/637: Performed by: INTERNAL MEDICINE

## 2019-07-01 PROCEDURE — 77030020263 HC SOL INJ SOD CL0.9% LFCR 1000ML

## 2019-07-01 PROCEDURE — 74011000250 HC RX REV CODE- 250: Performed by: HOSPITALIST

## 2019-07-01 PROCEDURE — 36415 COLL VENOUS BLD VENIPUNCTURE: CPT

## 2019-07-01 PROCEDURE — 74011250636 HC RX REV CODE- 250/636: Performed by: INTERNAL MEDICINE

## 2019-07-01 PROCEDURE — 65270000029 HC RM PRIVATE

## 2019-07-01 PROCEDURE — 74022 RADEX COMPL AQT ABD SERIES: CPT

## 2019-07-01 PROCEDURE — 74011250636 HC RX REV CODE- 250/636: Performed by: HOSPITALIST

## 2019-07-01 PROCEDURE — 74011250637 HC RX REV CODE- 250/637: Performed by: PHYSICIAN ASSISTANT

## 2019-07-01 PROCEDURE — 84484 ASSAY OF TROPONIN QUANT: CPT

## 2019-07-01 PROCEDURE — 93005 ELECTROCARDIOGRAM TRACING: CPT | Performed by: PHYSICIAN ASSISTANT

## 2019-07-01 RX ORDER — PANTOPRAZOLE SODIUM 40 MG/1
40 TABLET, DELAYED RELEASE ORAL
Status: DISCONTINUED | OUTPATIENT
Start: 2019-07-02 | End: 2019-07-03 | Stop reason: HOSPADM

## 2019-07-01 RX ORDER — POLYETHYLENE GLYCOL 3350 17 G/17G
255 POWDER, FOR SOLUTION ORAL ONCE
Status: COMPLETED | OUTPATIENT
Start: 2019-07-01 | End: 2019-07-01

## 2019-07-01 RX ORDER — SIMETHICONE 80 MG
80 TABLET,CHEWABLE ORAL
Status: DISCONTINUED | OUTPATIENT
Start: 2019-07-01 | End: 2019-07-03 | Stop reason: HOSPADM

## 2019-07-01 RX ORDER — BISACODYL 5 MG
10 TABLET, DELAYED RELEASE (ENTERIC COATED) ORAL
Status: COMPLETED | OUTPATIENT
Start: 2019-07-01 | End: 2019-07-01

## 2019-07-01 RX ADMIN — DOCUSATE SODIUM 100 MG: 100 CAPSULE, LIQUID FILLED ORAL at 17:59

## 2019-07-01 RX ADMIN — POLYETHYLENE GLYCOL 3350 255 G: 17 POWDER, FOR SOLUTION ORAL at 18:30

## 2019-07-01 RX ADMIN — Medication 10 ML: at 22:35

## 2019-07-01 RX ADMIN — Medication 5 ML: at 05:38

## 2019-07-01 RX ADMIN — DOCUSATE SODIUM 100 MG: 100 CAPSULE, LIQUID FILLED ORAL at 08:32

## 2019-07-01 RX ADMIN — SODIUM CHLORIDE 125 ML/HR: 900 INJECTION, SOLUTION INTRAVENOUS at 02:56

## 2019-07-01 RX ADMIN — BISACODYL 10 MG: 10 SUPPOSITORY RECTAL at 08:32

## 2019-07-01 RX ADMIN — FAMOTIDINE 20 MG: 10 INJECTION, SOLUTION INTRAVENOUS at 08:32

## 2019-07-01 RX ADMIN — METHYLPREDNISOLONE SODIUM SUCCINATE 20 MG: 40 INJECTION, POWDER, FOR SOLUTION INTRAMUSCULAR; INTRAVENOUS at 08:32

## 2019-07-01 RX ADMIN — BISACODYL 10 MG: 5 TABLET, COATED ORAL at 17:59

## 2019-07-01 RX ADMIN — METHYLPREDNISOLONE SODIUM SUCCINATE 20 MG: 40 INJECTION, POWDER, FOR SOLUTION INTRAMUSCULAR; INTRAVENOUS at 22:33

## 2019-07-01 NOTE — PROGRESS NOTES
[facility] Progress Note         Admit Date:  6/27/2019  Today's Date:  7/1/2019    CC:  Chest pain    Subjective:     Pt c/o CP that started yesterday. Denies any SOB. States that she had a recent negative cardiac work-up at [facility].     No N/V. Feels more bloated today. Mild upper abdominal pain reported. Passing flatus. Reports 3 small volume loose stools earlier today. No bleeding. Medications:   Current Facility-Administered Medications   Medication Dose Route Frequency    docusate sodium (COLACE) capsule 100 mg  100 mg Oral BID    methylPREDNISolone (PF) (SOLU-MEDROL) injection 20 mg  20 mg IntraVENous Q12H    sodium chloride (NS) flush 5-40 mL  5-40 mL IntraVENous Q8H    sodium chloride (NS) flush 5-40 mL  5-40 mL IntraVENous PRN    diphenhydrAMINE (BENADRYL) injection 12.5 mg  12.5 mg IntraVENous Q4H PRN    ondansetron (ZOFRAN) injection 4 mg  4 mg IntraVENous Q4H PRN    acetaminophen (TYLENOL) solution 650 mg  650 mg Oral Q4H PRN    morphine injection 2 mg  2 mg IntraVENous Q3H PRN    naloxone (NARCAN) injection 0.4 mg  0.4 mg IntraVENous PRN    famotidine (PF) (PEPCID) 20 mg in sodium chloride 0.9% 10 mL injection  20 mg IntraVENous Q12H    bisacodyl (DULCOLAX) suppository 10 mg  10 mg Rectal DAILY    hydrALAZINE (APRESOLINE) 20 mg/mL injection 10 mg  10 mg IntraVENous Q6H PRN    0.9% sodium chloride infusion  125 mL/hr IntraVENous CONTINUOUS    phenol throat spray (CHLORASEPTIC) 1 Spray  1 Spray Oral PRN       Review of Systems:  ROS was obtained, with pertinent positives as listed above. No chest pain or SOB. Diet:  CLD    Objective:   Vitals:  Visit Vitals  /87 (BP 1 Location: Right arm, BP Patient Position: At rest;Supine)   Pulse (!) 55   Temp 98 °F (36.7 °C)   Resp 18   Ht 5' 6\" (1.676 m)   Wt 52.6 kg (116 lb)   SpO2 98%   BMI 18.72 kg/m²     Intake/Output:  No intake/output data recorded.   06/29 1901 - 07/01 0700  In: 3492 [I.V.:3492]  Out: 200 [Urine:200]  Exam:  General appearance: alert, cooperative, no distress  Lungs: clear to auscultation bilaterally anteriorly  Heart: regular rate and rhythm  Abdomen: soft w/ NABS. Mildly distended and mildly TTP diffusely < upper abdomen. No G/R. Extremities: extremities normal, atraumatic, no cyanosis or edema  Neuro:  alert and oriented    Data Review (Labs):    Recent Labs     06/30/19  0458 06/29/19  0353   WBC 6.5 8.6   HGB 11.8 11.8   HCT 37.3 37.2    268   MCV 95.6 96.1    141   K 3.8 3.8   * 107   CO2 26 29   BUN 9 13   CREA 0.66 0.77   CA 8.3 8.0*   MG  --  2.4   * 100       Assessment:     Principal Problem:  SBO (small bowel obstruction) ([facility]) (6/28/2019)    Active Problems:  Terminal ileitis ([address] (6/28/2019)  Leukocytosis (6/28/2019)  Chronic constipation   Chest pain    49 y/o F w/ no known significant PMHx who presents with abdominal pain, N/V, and an abnormal CT scan of the abdomen and pelvis. Patient reports chronic constipation hx. However, PTA her constipation has become worse and has been associated with abdominal pain with N/V. Abdominal series in the ED revealed a prominent loop of small bowel in the mid abdomen - ileus versus obstruction. Subsequent CT scan showed thickened, edematous, and hyperenhancing mucosa of the terminal ileum with associated small bowel obstruction - improved after NG tube decompression, clinically resolved at this point. Patient denies any prior history of colonoscopy. Currently on IV solumedrol for presumed Crohn's Patient reports recent onset of CP. Plan:     Recent onset CP. Case discussed with Dr. Quentin Lopez. STAT EKG and troponins. If negative prep for colonoscopy tomorrow. NPO at midnight. Continue solumedrol and empiric abx.        Dalton Louis PA-C  [facility]

## 2019-07-01 NOTE — PROGRESS NOTES
Interdisciplinary team rounds were held 7/1/2019 with the following team members:Care Management, Nursing, Nutrition, Pharmacy, Physical Therapy and Physician . Plan of care discussed. See clinical pathway and/or care plan for interventions and desired outcomes.

## 2019-07-01 NOTE — PROGRESS NOTES
Shift Assessment - Patient is alert and oriented x4. No complaint of pain at this time. Lungs are clear. Resp even. Abdomen is soft and tender. Mild distension to abdomen. Currently resting in a low, locked bed. Call light in reach.

## 2019-07-01 NOTE — PROGRESS NOTES
Progress Note    Patient: Kit Pimentel MRN: [id number]  SSN: [id number]    YOB: 1965  Age: 48 y.o. Sex: female      Admit Date: 6/27/2019    LOS: 3 days     Subjective:     Admitted due to constipation and abdominal pain. Found to have small bowel obstruction with ileitis. Patient is feeling better. Still with some abdominal pain from time to time. On antibiotics and IV Solumedrol. She is being treated for presumed Crohn's disease. Objective:     Vitals:    06/30/19 2337 06/30/19 2344 07/01/19 0415 07/01/19 0810   BP: (!) 148/101 143/84 144/80 144/81   Pulse: (!) 55 (!) 54 (!) 51 (!) 50   Resp: 18 18 18 18   Temp: 97.2 °F (36.2 °C) 97.2 °F (36.2 °C) 97.4 °F (36.3 °C) 97.9 °F (36.6 °C)   SpO2: 97% 97% 96% 98%   Weight:       Height:            Intake and Output:  Current Shift: No intake/output data recorded. Last three shifts: 06/29 1901 - 07/01 0700  In: 3492 [I.V.:3492]  Out: 200 [Urine:200]    Physical Exam:   General:                    The patient is a pleasant middle aged female in no acute respiratory distress. Talking. Resting in bed. Head:                                   Normocephalic/atraumatic. Eyes:                                   No palpebral pallor or scleral icterus. ENT:                                    External auricular and nasal exam within normal limits. Mucous membranes are moist.  Neck:                                   Supple, non-tender, no JVD. Lungs:                       Clear to auscultation bilaterally without wheezes or crackles. No respiratory distress or accessory muscle use. Heart:                                  Regular rate and rhythm, without murmurs, rubs, or gallops. Abdomen:                  Soft, mildly tender, moderately distended with normoactive bowel sounds.    Genitourinary:           No tenderness over the bladder or bilateral CVAs.  Extremities:               Without clubbing, cyanosis, or edema. Skin:                                    Normal color, texture, and turgor. No rashes, lesions, or jaundice. Pulses:                      Radial and dorsalis pedis pulses present 2+ bilaterally. Capillary refill <2s. Neurologic:                CN II-XII grossly intact and symmetrical.                                               Moving all four extremities well with no focal deficits. Psychiatric:                Pleasant demeanor, appropriate affect. Alert and oriented x 3      Lab/Data Review:    No results found for this or any previous visit (from the past 24 hour(s)). CT abdomen and pelvis   6-  IMPRESSION:     Terminal ileitis resulting in a small bowel obstruction. If chronic this could  represent the presence of a stricture. Consider the possibility of infection  Crohn's disease in backwash ileitis from ulcerative colitis is the most likely  Possibilities. XR abdomen   6-  IMPRESSION: Prominent loop of small bowel mid abdomen.  Ileus versus obstruction.       Current Facility-Administered Medications:     docusate sodium (COLACE) capsule 100 mg, 100 mg, Oral, BID, Adelia Cotter MD, 100 mg at 07/01/19 4179    methylPREDNISolone (PF) (SOLU-MEDROL) injection 20 mg, 20 mg, IntraVENous, Q12H, Adelia Cotter MD, 20 mg at 07/01/19 2512    sodium chloride (NS) flush 5-40 mL, 5-40 mL, IntraVENous, Q8H, Teresa Mata MD, 5 mL at 07/01/19 0538    sodium chloride (NS) flush 5-40 mL, 5-40 mL, IntraVENous, PRN, Teresa Mata MD    diphenhydrAMINE (BENADRYL) injection 12.5 mg, 12.5 mg, IntraVENous, Q4H PRN, Teresa Mata MD    [facility]ansetron [facility]) injection 4 mg, 4 mg, IntraVENous, Q4H PRN, Teresa Mata MD, 4 mg at 06/28/19 1411    acetaminophen (TYLENOL) solution 650 mg, 650 mg, Oral, Q4H PRN, Teresa Mata MD    morphine injection 2 mg, 2 mg, IntraVENous, Q3H PRN, Jadon Frost MD, 2 mg at 06/28/19 1402    naloxone [facility]) injection 0.4 mg, 0.4 mg, IntraVENous, PRN, Jadon Frost MD    famotidine (PF) (PEPCID) 20 mg in sodium chloride 0.9% 10 mL injection, 20 mg, IntraVENous, Q12H, Jadon Frost MD, 20 mg at 07/01/19 1029    bisacodyl (DULCOLAX) suppository 10 mg, 10 mg, Rectal, DAILY, Zafar ALBRECHT MD, 10 mg at 07/01/19 1096    hydrALAZINE (APRESOLINE) 20 mg/mL injection 10 mg, 10 mg, IntraVENous, Q6H PRN, Jadon Frost MD    0.9% sodium chloride infusion, 125 mL/hr, IntraVENous, CONTINUOUS, Rosemary Ventura MD, Last Rate: 125 mL/hr at 07/01/19 0256, 125 mL/hr at 07/01/19 0256    phenol throat spray (CHLORASEPTIC) 1 Spray, 1 Spray, Oral, PRN, Rosemary Ventura MD      Assessment:     Principal Problem:    SBO (small bowel obstruction) ([facility] (6/28/2019)    Active Problems:    Terminal ileitis ([facility] [address] (6/28/2019)      Leukocytosis (6/28/2019)        Plan:     Small bowel obstruction. ileitis  Continue NPO as per GI.   IV fluid. Check acute abdomen series today. Symptomatic treatments   GI is following. Plan for colonoscopy may be tomorrow. Empiric IV antibiotics and IV Solumedrol. I have discussed the plan of care with patient.       DVT prophylaxis : SCD       Signed By: Felisha Colón MD     July 1, 2019

## 2019-07-02 ENCOUNTER — ANESTHESIA EVENT (OUTPATIENT)
Dept: ENDOSCOPY | Age: 54
DRG: 390 | End: 2019-07-02
Payer: SELF-PAY

## 2019-07-02 ENCOUNTER — ANESTHESIA (OUTPATIENT)
Dept: ENDOSCOPY | Age: 54
DRG: 390 | End: 2019-07-02
Payer: SELF-PAY

## 2019-07-02 LAB
ATRIAL RATE: 41 BPM
CALCULATED P AXIS, ECG09: 57 DEGREES
CALCULATED R AXIS, ECG10: 53 DEGREES
CALCULATED T AXIS, ECG11: 70 DEGREES
DIAGNOSIS, 93000: NORMAL
P-R INTERVAL, ECG05: 166 MS
Q-T INTERVAL, ECG07: 514 MS
QRS DURATION, ECG06: 84 MS
QTC CALCULATION (BEZET), ECG08: 424 MS
VENTRICULAR RATE, ECG03: 41 BPM

## 2019-07-02 PROCEDURE — 74011250637 HC RX REV CODE- 250/637: Performed by: PHYSICIAN ASSISTANT

## 2019-07-02 PROCEDURE — 74011250636 HC RX REV CODE- 250/636: Performed by: INTERNAL MEDICINE

## 2019-07-02 PROCEDURE — 74011250636 HC RX REV CODE- 250/636

## 2019-07-02 PROCEDURE — 77030021593 HC FCPS BIOP ENDOSC BSC -A: Performed by: INTERNAL MEDICINE

## 2019-07-02 PROCEDURE — 65270000029 HC RM PRIVATE

## 2019-07-02 PROCEDURE — 74011250637 HC RX REV CODE- 250/637: Performed by: INTERNAL MEDICINE

## 2019-07-02 PROCEDURE — 77030020263 HC SOL INJ SOD CL0.9% LFCR 1000ML

## 2019-07-02 PROCEDURE — 0DBE8ZX EXCISION OF LARGE INTESTINE, VIA NATURAL OR ARTIFICIAL OPENING ENDOSCOPIC, DIAGNOSTIC: ICD-10-PCS | Performed by: INTERNAL MEDICINE

## 2019-07-02 PROCEDURE — 77030013991 HC SNR POLYP ENDOSC BSC -A: Performed by: INTERNAL MEDICINE

## 2019-07-02 PROCEDURE — 76060000032 HC ANESTHESIA 0.5 TO 1 HR: Performed by: INTERNAL MEDICINE

## 2019-07-02 PROCEDURE — 0DBB8ZX EXCISION OF ILEUM, VIA NATURAL OR ARTIFICIAL OPENING ENDOSCOPIC, DIAGNOSTIC: ICD-10-PCS | Performed by: INTERNAL MEDICINE

## 2019-07-02 PROCEDURE — 88305 TISSUE EXAM BY PATHOLOGIST: CPT

## 2019-07-02 PROCEDURE — 74011250636 HC RX REV CODE- 250/636: Performed by: HOSPITALIST

## 2019-07-02 PROCEDURE — 0DBP8ZX EXCISION OF RECTUM, VIA NATURAL OR ARTIFICIAL OPENING ENDOSCOPIC, DIAGNOSTIC: ICD-10-PCS | Performed by: INTERNAL MEDICINE

## 2019-07-02 PROCEDURE — 76040000026: Performed by: INTERNAL MEDICINE

## 2019-07-02 PROCEDURE — 0DBN8ZX EXCISION OF SIGMOID COLON, VIA NATURAL OR ARTIFICIAL OPENING ENDOSCOPIC, DIAGNOSTIC: ICD-10-PCS | Performed by: INTERNAL MEDICINE

## 2019-07-02 RX ORDER — LIDOCAINE HYDROCHLORIDE 20 MG/ML
INJECTION, SOLUTION EPIDURAL; INFILTRATION; INTRACAUDAL; PERINEURAL AS NEEDED
Status: DISCONTINUED | OUTPATIENT
Start: 2019-07-02 | End: 2019-07-02 | Stop reason: HOSPADM

## 2019-07-02 RX ORDER — PROPOFOL 10 MG/ML
INJECTION, EMULSION INTRAVENOUS AS NEEDED
Status: DISCONTINUED | OUTPATIENT
Start: 2019-07-02 | End: 2019-07-02 | Stop reason: HOSPADM

## 2019-07-02 RX ADMIN — LIDOCAINE HYDROCHLORIDE 40 MG: 20 INJECTION, SOLUTION EPIDURAL; INFILTRATION; INTRACAUDAL; PERINEURAL at 12:42

## 2019-07-02 RX ADMIN — PROPOFOL 25 MG: 10 INJECTION, EMULSION INTRAVENOUS at 12:49

## 2019-07-02 RX ADMIN — Medication 10 ML: at 14:08

## 2019-07-02 RX ADMIN — PROPOFOL 25 MG: 10 INJECTION, EMULSION INTRAVENOUS at 13:03

## 2019-07-02 RX ADMIN — PROPOFOL 50 MG: 10 INJECTION, EMULSION INTRAVENOUS at 12:44

## 2019-07-02 RX ADMIN — PROPOFOL 25 MG: 10 INJECTION, EMULSION INTRAVENOUS at 12:53

## 2019-07-02 RX ADMIN — METHYLPREDNISOLONE SODIUM SUCCINATE 20 MG: 40 INJECTION, POWDER, FOR SOLUTION INTRAMUSCULAR; INTRAVENOUS at 07:46

## 2019-07-02 RX ADMIN — HYDRALAZINE HYDROCHLORIDE 10 MG: 20 INJECTION INTRAMUSCULAR; INTRAVENOUS at 15:03

## 2019-07-02 RX ADMIN — PROPOFOL 25 MG: 10 INJECTION, EMULSION INTRAVENOUS at 12:54

## 2019-07-02 RX ADMIN — Medication 10 ML: at 05:45

## 2019-07-02 RX ADMIN — PROPOFOL 25 MG: 10 INJECTION, EMULSION INTRAVENOUS at 12:51

## 2019-07-02 RX ADMIN — PROPOFOL 25 MG: 10 INJECTION, EMULSION INTRAVENOUS at 13:11

## 2019-07-02 RX ADMIN — PROPOFOL 25 MG: 10 INJECTION, EMULSION INTRAVENOUS at 13:02

## 2019-07-02 RX ADMIN — PROPOFOL 50 MG: 10 INJECTION, EMULSION INTRAVENOUS at 12:47

## 2019-07-02 RX ADMIN — SODIUM CHLORIDE 125 ML/HR: 900 INJECTION, SOLUTION INTRAVENOUS at 04:16

## 2019-07-02 RX ADMIN — PROPOFOL 50 MG: 10 INJECTION, EMULSION INTRAVENOUS at 12:42

## 2019-07-02 RX ADMIN — DOCUSATE SODIUM 100 MG: 100 CAPSULE, LIQUID FILLED ORAL at 17:39

## 2019-07-02 RX ADMIN — PROPOFOL 25 MG: 10 INJECTION, EMULSION INTRAVENOUS at 13:14

## 2019-07-02 RX ADMIN — PROPOFOL 25 MG: 10 INJECTION, EMULSION INTRAVENOUS at 12:57

## 2019-07-02 RX ADMIN — PROPOFOL 25 MG: 10 INJECTION, EMULSION INTRAVENOUS at 12:56

## 2019-07-02 RX ADMIN — PROPOFOL 25 MG: 10 INJECTION, EMULSION INTRAVENOUS at 13:00

## 2019-07-02 RX ADMIN — SODIUM CHLORIDE 125 ML/HR: 900 INJECTION, SOLUTION INTRAVENOUS at 14:08

## 2019-07-02 RX ADMIN — Medication 10 ML: at 22:00

## 2019-07-02 RX ADMIN — PROPOFOL 25 MG: 10 INJECTION, EMULSION INTRAVENOUS at 12:59

## 2019-07-02 RX ADMIN — PROPOFOL 25 MG: 10 INJECTION, EMULSION INTRAVENOUS at 13:07

## 2019-07-02 RX ADMIN — PANTOPRAZOLE SODIUM 40 MG: 40 TABLET, DELAYED RELEASE ORAL at 07:47

## 2019-07-02 RX ADMIN — PROPOFOL 50 MG: 10 INJECTION, EMULSION INTRAVENOUS at 12:41

## 2019-07-02 RX ADMIN — PROPOFOL 25 MG: 10 INJECTION, EMULSION INTRAVENOUS at 13:05

## 2019-07-02 NOTE — PROGRESS NOTES
Pt denies chest pain type symptoms when asked about at this time and stated \"she had chest pain earlier today\". Dr. Juli Welsh notified and no new orders received.

## 2019-07-02 NOTE — ROUTINE PROCESS
TRANSFER - OUT REPORT: 
 
Verbal report given to Maite(name) on Fadumo Mota  being transferred to med/surg(unit) for routine post - op Report consisted of patients Situation, Background, Assessment and  
Recommendations(SBAR). Information from the following report(s) SBAR, Kardex, Procedure Summary, Intake/Output, MAR, Recent Results and Cardiac Rhythm sinus akanksha was reviewed with the receiving nurse. Lines:  
Peripheral IV 06/27/19 Right Antecubital (Active) Site Assessment Clean, dry, & intact 7/2/2019  7:52 AM  
Phlebitis Assessment 0 7/2/2019  7:52 AM  
Infiltration Assessment 0 7/2/2019  7:52 AM  
Dressing Status Clean, dry, & intact 7/2/2019  7:52 AM  
Dressing Type Transparent;Tape 7/2/2019  7:52 AM  
Hub Color/Line Status Capped; Patent 7/2/2019  7:52 AM  
   
Peripheral IV 06/30/19 Anterior; Left Forearm (Active) Site Assessment Clean, dry, & intact 7/2/2019  7:52 AM  
Phlebitis Assessment 0 7/2/2019  7:52 AM  
Infiltration Assessment 0 7/2/2019  7:52 AM  
Dressing Status Clean, dry, & intact 7/2/2019  7:52 AM  
Dressing Type Transparent;Tape 7/2/2019  7:52 AM  
Hub Color/Line Status Infusing 7/2/2019  7:52 AM  
  
 
Opportunity for questions and clarification was provided. Patient transported with: 
 O2 @ 2 liters

## 2019-07-02 NOTE — PROGRESS NOTES
7/2/2019            RE: Yonas Alcaraz         [address]              To Whom It May Concern,      Due to medical reasons, Yonas Alcaraz has been receiving treatment in the hospital and colonoscopy 7/2/2019. Feel free to call if any questions: [phone].

## 2019-07-02 NOTE — PROGRESS NOTES
Gastroenterology    Received a message from pt's RN regarding pt having questions regarding result of colonoscopy. I am seeing pt's at [facility] and spoke to the pt by phone at [facility]. Reviewed the findings of the colonoscopy. She is concerned regarding persistent bloating, upper abd pain and substernal pain radiating to the left side of her chest and under her breast.  She has not been able to associate anything with the pain. We discussed considering EGD tomorrow depending on if she has persistent symptoms. She is on Protonix daily. Troponin was 0.02 yesterday. She agrees. Will keep her NPO after MN and re-evaluate in the morning for possible EGD. Colonoscopy 2 July 2019 with Dr. Peter Zarate with Postoperative Diagnosis: sigmoid polyps (3), rectal polyp, sigmoid diverticulosis, internal hemorrhoids. Terminal ileum and colon without findings of inflammation or Crohn's. Recommendations:   Routine post colonoscopy polypectomy instructions. Start GI soft diet. Conservative management of diverticulosis and hemorrhoids. Colon screening based on pathology of polyps removed. Further recommendations per Dr. Truman Parr and inpatient rounding team.     Ref. Range 7/1/2019 16:15   Troponin-I, Qt. Latest Ref Range: 0.02 - 0.05 NG/ML <0.02 (L)     7/2/2019  9:49 AM - [place], Card Result In   Component Value Ref Range & Units Status   Ventricular Rate 41  BPM Final   Atrial Rate 41  BPM Final   P-R Interval 166  ms Final   QRS Duration 84  ms Final   Q-T Interval 514  ms Final   QTC Calculation (Bezet) 424  ms Final   Calculated P Axis 57  degrees Final   Calculated R Axis 53  degrees Final   Calculated T Axis 70  degrees Final   Diagnosis   Final   Marked sinus bradycardia   Possible Left atrial enlargement   Abnormal ECG   No previous ECGs available   Confirmed by [facility] GARRIDO MD (), KAMILA CHENG ([id number]) on 7/2/2019 9:48:59 AM      Discussed with Dr. Truman Parr and he agrees.     I discussed with pt's RN also regarding pt describing continued chest pain type symptoms. Geno Olmedo [facility]  [facility]

## 2019-07-02 NOTE — PROGRESS NOTES
Interdisciplinary team rounds were held 7/2/2019 with the following team members:Care Management, Nursing, Nutrition, Pharmacy, Physical Therapy and Physician. Plan of care discussed. See clinical pathway and/or care plan for interventions and desired outcomes.

## 2019-07-02 NOTE — ANESTHESIA POSTPROCEDURE EVALUATION
Procedure(s):  COLONOSCOPY/ BMI 19 ROOM 363  COLON BIOPSY  ENDOSCOPIC POLYPECTOMY.     total IV anesthesia    Anesthesia Post Evaluation        Patient location during evaluation: PACU  Patient participation: complete - patient participated  Level of consciousness: awake and alert  Pain management: adequate  Airway patency: patent  Anesthetic complications: no  Cardiovascular status: acceptable  Respiratory status: acceptable  Hydration status: acceptable  Post anesthesia nausea and vomiting:  none      Vitals Value Taken Time   /80 7/2/2019  1:37 PM   Temp     Pulse 53 7/2/2019  1:37 PM   Resp 16 7/2/2019  1:37 PM   SpO2 100 % 7/2/2019  1:37 PM

## 2019-07-02 NOTE — PROGRESS NOTES
Care Management Interventions  PCP Verified by CM: Yes  Mode of Transport at Discharge: Self  Transition of Care Consult (CM Consult): Discharge Planning  Current Support Network: Own Home  Confirm Follow Up Transport: Self  Plan discussed with Pt/Family/Caregiver: Yes  Freedom of Choice Offered: Yes  Discharge Location  Discharge Placement: Home with family assistance(Self-Pay resources provided )  Saw pt in interdisciplinarily rounds with the following disciplines: Physician, Case Management, Nursing, Dietary,Therapy and Pharmacy. The plan of care was discussed along with discharge date/ location. Pt is sched to have and endoscopy today.

## 2019-07-02 NOTE — PROGRESS NOTES
TRANSFER - OUT REPORT:    Verbal report given to [address] [place] [address] on Andreea Murillo  being transferred to Preop for ordered procedure       Report consisted of patients Situation, Background, Assessment and   Recommendations(SBAR). Information from the following report(s) SBAR, ED Summary, Intake/Output and MAR was reviewed with the receiving nurse. Lines:   Peripheral IV 06/27/19 Right Antecubital (Active)   Site Assessment Clean, dry, & intact 7/2/2019  7:52 AM   Phlebitis Assessment 0 7/2/2019  7:52 AM   Infiltration Assessment 0 7/2/2019  7:52 AM   Dressing Status Clean, dry, & intact 7/2/2019  7:52 AM   Dressing Type Transparent;Tape 7/2/2019  7:52 AM   Hub Color/Line Status Capped; Patent 7/2/2019  7:52 AM       Peripheral IV 06/30/19 Anterior; Left Forearm (Active)   Site Assessment Clean, dry, & intact 7/2/2019  7:52 AM   Phlebitis Assessment 0 7/2/2019  7:52 AM   Infiltration Assessment 0 7/2/2019  7:52 AM   Dressing Status Clean, dry, & intact 7/2/2019  7:52 AM   Dressing Type Transparent;Tape 7/2/2019  7:52 AM   Hub Color/Line Status Infusing 7/2/2019  7:52 AM        Opportunity for questions and clarification was provided.

## 2019-07-02 NOTE — PROGRESS NOTES
Received patient from PACU. VSS. IVF infusing without difficulty. SCDs in place. Pt denies pain at this time. Encouraged to call for help when needed. Pt aware of GI soft diet pt verbalized understanding.

## 2019-07-02 NOTE — PROGRESS NOTES
TRANSFER - IN REPORT:    [facility] on Yonas Alcaraz  being received from PACU for routine post - op      Report consisted of patients Situation, Background, Assessment and   Recommendations(SBAR). Information from the following report(s) SBAR, OR Summary, Intake/Output and MAR was reviewed with the receiving nurse. Opportunity for questions and clarification was provided. Assessment completed upon patients arrival to unit and care assumed.

## 2019-07-02 NOTE — PROGRESS NOTES
Blood pressure 180/92, HR 45 apical. Per Dr. Miguel hutson to give Hydralazine 10 mg IV slowly. Will recheck blood pressure.

## 2019-07-02 NOTE — ANESTHESIA PREPROCEDURE EVALUATION
Relevant Problems   No relevant active problems       Anesthetic History               Review of Systems / Medical History  Patient summary reviewed    Pulmonary          Smoker         Neuro/Psych              Cardiovascular    Hypertension (no meds)              Exercise tolerance: >4 METS     GI/Hepatic/Renal                Endo/Other             Other Findings   Comments: SBO, resolved, no current nausea, staes she is hungry.          Physical Exam    Airway  Mallampati: II  TM Distance: > 6 cm  Neck ROM: normal range of motion   Mouth opening: Normal     Cardiovascular  Regular rate and rhythm,  S1 and S2 normal,  no murmur, click, rub, or gallop             Dental  No notable dental hx       Pulmonary  Breath sounds clear to auscultation               Abdominal         Other Findings            Anesthetic Plan    ASA: 2  Anesthesia type: total IV anesthesia            Anesthetic plan and risks discussed with: Patient

## 2019-07-02 NOTE — PROGRESS NOTES
Hospitalist Progress Note    2019  Admit Date: 2019  9:13 PM   NAME: Deb Freitas   :  1965   MRN:  [id number]   Attending: Jamal Davis MD  PCP:  Tish Gibbons MD    SUBJECTIVE:   - Patient seen and examined this morning. She has no complaints except for occasional pain on and off. Review of Systems negative with exception of pertinent positives noted above  PHYSICAL EXAM     Visit Vitals  BP (!) 184/94 (BP 1 Location: Left arm, BP Patient Position: At rest)   Pulse (!) 48   Temp 97.8 °F (36.6 °C)   Resp 16   Ht 5' 6\" (1.676 m)   Wt 52.6 kg (116 lb)   SpO2 99%   BMI 18.72 kg/m²      Temp (24hrs), Av.7 °F (36.5 °C), Min:95.8 °F (35.4 °C), Max:98.7 °F (37.1 °C)    Oxygen Therapy  O2 Sat (%): 99 % (19 1417)  Pulse via Oximetry: 76 beats per minute (19 0630)  O2 Device: Nasal cannula (19 1337)  O2 Flow Rate (L/min): 2 l/min (19 1337)    Intake/Output Summary (Last 24 hours) at 2019 1501  Last data filed at 2019 1345  Gross per 24 hour   Intake 3774 ml   Output 600 ml   Net 3174 ml      General: No acute distress    Lungs:  CTA Bilaterally. Heart:  Regular rate and rhythm,  No murmur, rub, or gallop  Abdomen: Soft, Non distended, mild abdominal tenderness, Positive bowel sounds  Extremities: No cyanosis, clubbing or edema  Neurologic:  No focal deficits    ASSESSMENT      Active Hospital Problems    Diagnosis Date Noted    Terminal ileitis ([facility] 2019    SBO (small bowel obstruction) ([facility] 2019    Leukocytosis 2019     Plan:    Terminal Ileitis- s/p colonoscopy, no findings on scope suggestive of inflammation or Chrons disease. Mild diverticulosis and moderate internal hemorrhoids seen. Advancing diet to GI soft today. Will discontinue steroids.     DVT Prophylaxis: SCDs    Signed By: Fidel Hercules MD     2019

## 2019-07-02 NOTE — PROGRESS NOTES
Problem: Falls - Risk of  Goal: *Absence of Falls  Description  Document Tom Regan Fall Risk and appropriate interventions in the flowsheet.   Outcome: Progressing Towards Goal  Note:   Fall Risk Interventions:            Medication Interventions: Bed/chair exit alarm

## 2019-07-02 NOTE — PROCEDURES
OPERATIVE NOTE    Date of Procedure: 7/2/2019   Preoperative Diagnosis: [facility] [E41.774] based on CT. Abdominal Pain, Chronic constipation,  Postoperative Diagnosis: sigmoid polyps (3), rectal polyp, sigmoid diverticulosis, internal hemorrhoids. Terminal ileum and colon without findings of inflammation or Crohn's. Procedure(s):  COLONOSCOPY/ BMI 19 ROOM 363  COLON BIOPSY  ENDOSCOPIC POLYPECTOMY  Surgeon(s) and Role:     * Og Michele MD - Primary  Surgical Staff:  Endoscopy RN-1: Brandi Ang  Endoscopy RN-2: David Nguyen RN  Endoscopy RN-Relief: Love Stanley RN  No case tracking events are documented in the log. Anesthesia: MAC   Estimated Blood Loss: Minimal  Specimens:   ID Type Source Tests Collected by Time Destination   1 : Terminal Ileum Preservative   Og Michele MD 7/2/2019 1253 Pathology   2 : Random Colon Biopsy Preservative   Richie Larson MD 7/2/2019 1255 Pathology   3 : Sigmoid Colon Polyps Preservative   Richie Larson MD 7/2/2019 1303 Pathology   4 : Rectal Polyp Preservative   Og Michele MD 7/2/2019 1317 Pathology      Procedure:  After informed consent, the patient was placed in the left lateral position and received iv anesthesia. See anesthesiology records for details. A digital rectal exam was performed. The Colonoscope was inserted into the rectum and passed under direct vison to the cecum where the ileocecal valve and appendiceal orifice were identified. Colonoscope was then slowly withdrawn with careful evaluation of the mucosa. Retroflexion was performed in the rectum. Prep was fair. Visualization was adequate. The patient was taken to the recovery area in stable condition. Findings:  TERMINAL ILEUM: evaluated for 10 cms and appeared normal. Biopsies obatined,  COLON: Random Colon biopsies obtained throughout Colon. Mild sigmoid diverticulosis noted without diverticulitis.   Benign appearing SC polyps x 3 - 10, 6 and 3 mm sized removed by snare with cautery, cold snare and cold bx respectively. Benign appearing 6 mm rectal polyp removed by cold snare. Moderate sized internal hemorrhoids noted. Small hyperplastic appearing polyps which flattened with air insufflation were not removed. Recommendations:   Routine post colonoscopy polypectomy instructions. Start GI soft diet. Conservative management of diverticulosis and hemorrhoids. Colon screening based on pathology of polyps removed. Further recommendations per Dr. Skye Cardenas and inpatient rounding team.    Complications: None.     Implants: * No implants in log *    Carlos Bates MD

## 2019-07-02 NOTE — INTERVAL H&P NOTE
H&P Update: 
Anaya Luna was seen and examined. History and physical has been reviewed. Significant clinical changes have occurred as noted:  See daily progress notes. Prepped and wishes to proceed with planned colonoscopy.  
Florence Pierre MD

## 2019-07-02 NOTE — PROGRESS NOTES
Shift assessment complete. Pt resting in bed. A&Ox4. Respirations present, even, unlabored. Lung sounds clear to auscultation. HR regular, S1&S2 auscultated. IVF infusing without difficulty. Pt denies pain at this time. Bilateral SCDs in place. Encouraged to call for help when needed, bed in lowest position, call light within reach, side rails x3.

## 2019-07-02 NOTE — PERIOP NOTES
TRANSFER - IN REPORT:    Verbal report received from [employer] on Rosi Iglesias  being received from 3rd floor med surg for routine progression of care      Report consisted of patients Situation, Background, Assessment and   Recommendations(SBAR). Information from the following report(s) Kardex and MAR was reviewed with the receiving nurse. Opportunity for questions and clarification was provided. Assessment completed upon patients arrival to unit and care assumed.

## 2019-07-03 ENCOUNTER — ANESTHESIA (OUTPATIENT)
Dept: ENDOSCOPY | Age: 54
End: 2019-07-03
Payer: SELF-PAY

## 2019-07-03 ENCOUNTER — HOSPITAL ENCOUNTER (OUTPATIENT)
Age: 54
Setting detail: OUTPATIENT SURGERY
Discharge: OTHER INSTITUTION | End: 2019-07-03
Attending: INTERNAL MEDICINE | Admitting: INTERNAL MEDICINE
Payer: SELF-PAY

## 2019-07-03 ENCOUNTER — ANESTHESIA EVENT (OUTPATIENT)
Dept: ENDOSCOPY | Age: 54
End: 2019-07-03
Payer: SELF-PAY

## 2019-07-03 ENCOUNTER — APPOINTMENT (OUTPATIENT)
Dept: ULTRASOUND IMAGING | Age: 54
DRG: 390 | End: 2019-07-03
Attending: INTERNAL MEDICINE
Payer: SELF-PAY

## 2019-07-03 VITALS
OXYGEN SATURATION: 98 % | TEMPERATURE: 97 F | RESPIRATION RATE: 16 BRPM | SYSTOLIC BLOOD PRESSURE: 159 MMHG | HEART RATE: 48 BPM | DIASTOLIC BLOOD PRESSURE: 97 MMHG

## 2019-07-03 LAB
ALBUMIN SERPL-MCNC: 2.8 G/DL (ref 3.5–5)
ALBUMIN/GLOB SERPL: 1.1 {RATIO} (ref 1.2–3.5)
ALP SERPL-CCNC: 62 U/L (ref 50–136)
ALT SERPL-CCNC: 107 U/L (ref 12–65)
ANION GAP SERPL CALC-SCNC: 5 MMOL/L (ref 7–16)
AST SERPL-CCNC: 51 U/L (ref 15–37)
BACTERIA SPEC CULT: NORMAL
BILIRUB SERPL-MCNC: 0.3 MG/DL (ref 0.2–1.1)
BUN SERPL-MCNC: 19 MG/DL (ref 6–23)
CALCIUM SERPL-MCNC: 8 MG/DL (ref 8.3–10.4)
CHLORIDE SERPL-SCNC: 109 MMOL/L (ref 98–107)
CO2 SERPL-SCNC: 29 MMOL/L (ref 21–32)
CREAT SERPL-MCNC: 0.83 MG/DL (ref 0.6–1)
ERYTHROCYTE [DISTWIDTH] IN BLOOD BY AUTOMATED COUNT: 14.2 % (ref 11.9–14.6)
GLOBULIN SER CALC-MCNC: 2.5 G/DL (ref 2.3–3.5)
GLUCOSE SERPL-MCNC: 105 MG/DL (ref 65–100)
HCT VFR BLD AUTO: 37.3 % (ref 35.8–46.3)
HGB BLD-MCNC: 11.6 G/DL (ref 11.7–15.4)
LACTOFERRIN, LCTFLT: 60.2 UG/ML(G) (ref 0–7.24)
MCH RBC QN AUTO: 29.7 PG (ref 26.1–32.9)
MCHC RBC AUTO-ENTMCNC: 31.1 G/DL (ref 31.4–35)
MCV RBC AUTO: 95.4 FL (ref 79.6–97.8)
NRBC # BLD: 0 K/UL (ref 0–0.2)
PLATELET # BLD AUTO: 284 K/UL (ref 150–450)
PMV BLD AUTO: 10.8 FL (ref 9.4–12.3)
POTASSIUM SERPL-SCNC: 3.4 MMOL/L (ref 3.5–5.1)
PROT SERPL-MCNC: 5.3 G/DL (ref 6.3–8.2)
RBC # BLD AUTO: 3.91 M/UL (ref 4.05–5.2)
SERVICE CMNT-IMP: NORMAL
SODIUM SERPL-SCNC: 143 MMOL/L (ref 136–145)
WBC # BLD AUTO: 15.2 K/UL (ref 4.3–11.1)

## 2019-07-03 PROCEDURE — 77030020263 HC SOL INJ SOD CL0.9% LFCR 1000ML

## 2019-07-03 PROCEDURE — 76060000031 HC ANESTHESIA FIRST 0.5 HR: Performed by: INTERNAL MEDICINE

## 2019-07-03 PROCEDURE — 80053 COMPREHEN METABOLIC PANEL: CPT

## 2019-07-03 PROCEDURE — 74011250636 HC RX REV CODE- 250/636: Performed by: INTERNAL MEDICINE

## 2019-07-03 PROCEDURE — 76705 ECHO EXAM OF ABDOMEN: CPT

## 2019-07-03 PROCEDURE — 36415 COLL VENOUS BLD VENIPUNCTURE: CPT

## 2019-07-03 PROCEDURE — 65270000029 HC RM PRIVATE

## 2019-07-03 PROCEDURE — 74011250636 HC RX REV CODE- 250/636

## 2019-07-03 PROCEDURE — 94760 N-INVAS EAR/PLS OXIMETRY 1: CPT

## 2019-07-03 PROCEDURE — 76040000025: Performed by: INTERNAL MEDICINE

## 2019-07-03 PROCEDURE — 74011250637 HC RX REV CODE- 250/637: Performed by: INTERNAL MEDICINE

## 2019-07-03 PROCEDURE — 85027 COMPLETE CBC AUTOMATED: CPT

## 2019-07-03 RX ORDER — NALOXONE HYDROCHLORIDE 0.4 MG/ML
0.4 INJECTION, SOLUTION INTRAMUSCULAR; INTRAVENOUS; SUBCUTANEOUS AS NEEDED
Status: DISCONTINUED | OUTPATIENT
Start: 2019-07-03 | End: 2019-07-04 | Stop reason: HOSPADM

## 2019-07-03 RX ORDER — HYDRALAZINE HYDROCHLORIDE 20 MG/ML
10 INJECTION INTRAMUSCULAR; INTRAVENOUS
Status: DISCONTINUED | OUTPATIENT
Start: 2019-07-03 | End: 2019-07-04 | Stop reason: HOSPADM

## 2019-07-03 RX ORDER — PANTOPRAZOLE SODIUM 40 MG/1
40 TABLET, DELAYED RELEASE ORAL
Status: DISCONTINUED | OUTPATIENT
Start: 2019-07-04 | End: 2019-07-04 | Stop reason: HOSPADM

## 2019-07-03 RX ORDER — DIPHENHYDRAMINE HYDROCHLORIDE 50 MG/ML
12.5 INJECTION, SOLUTION INTRAMUSCULAR; INTRAVENOUS
Status: DISCONTINUED | OUTPATIENT
Start: 2019-07-03 | End: 2019-07-04 | Stop reason: HOSPADM

## 2019-07-03 RX ORDER — DOCUSATE SODIUM 100 MG/1
100 CAPSULE, LIQUID FILLED ORAL 2 TIMES DAILY
Status: DISCONTINUED | OUTPATIENT
Start: 2019-07-03 | End: 2019-07-04 | Stop reason: HOSPADM

## 2019-07-03 RX ORDER — SODIUM CHLORIDE 0.9 % (FLUSH) 0.9 %
5-40 SYRINGE (ML) INJECTION AS NEEDED
Status: DISCONTINUED | OUTPATIENT
Start: 2019-07-03 | End: 2019-07-04 | Stop reason: HOSPADM

## 2019-07-03 RX ORDER — SODIUM CHLORIDE 0.9 % (FLUSH) 0.9 %
5-40 SYRINGE (ML) INJECTION EVERY 8 HOURS
Status: DISCONTINUED | OUTPATIENT
Start: 2019-07-03 | End: 2019-07-04 | Stop reason: HOSPADM

## 2019-07-03 RX ORDER — SODIUM CHLORIDE, SODIUM LACTATE, POTASSIUM CHLORIDE, CALCIUM CHLORIDE 600; 310; 30; 20 MG/100ML; MG/100ML; MG/100ML; MG/100ML
INJECTION, SOLUTION INTRAVENOUS
Status: DISCONTINUED | OUTPATIENT
Start: 2019-07-03 | End: 2019-07-03 | Stop reason: HOSPADM

## 2019-07-03 RX ORDER — ONDANSETRON 2 MG/ML
4 INJECTION INTRAMUSCULAR; INTRAVENOUS
Status: DISCONTINUED | OUTPATIENT
Start: 2019-07-03 | End: 2019-07-04 | Stop reason: HOSPADM

## 2019-07-03 RX ORDER — PROPOFOL 10 MG/ML
INJECTION, EMULSION INTRAVENOUS AS NEEDED
Status: DISCONTINUED | OUTPATIENT
Start: 2019-07-03 | End: 2019-07-03 | Stop reason: HOSPADM

## 2019-07-03 RX ORDER — SODIUM CHLORIDE 9 MG/ML
125 INJECTION, SOLUTION INTRAVENOUS CONTINUOUS
Status: DISCONTINUED | OUTPATIENT
Start: 2019-07-03 | End: 2019-07-04 | Stop reason: HOSPADM

## 2019-07-03 RX ORDER — FACIAL-BODY WIPES
10 EACH TOPICAL DAILY
Status: DISCONTINUED | OUTPATIENT
Start: 2019-07-04 | End: 2019-07-04 | Stop reason: HOSPADM

## 2019-07-03 RX ORDER — SIMETHICONE 80 MG
80 TABLET,CHEWABLE ORAL
Status: DISCONTINUED | OUTPATIENT
Start: 2019-07-03 | End: 2019-07-04 | Stop reason: HOSPADM

## 2019-07-03 RX ORDER — MORPHINE SULFATE 2 MG/ML
2 INJECTION, SOLUTION INTRAMUSCULAR; INTRAVENOUS
Status: DISCONTINUED | OUTPATIENT
Start: 2019-07-03 | End: 2019-07-04 | Stop reason: HOSPADM

## 2019-07-03 RX ADMIN — MORPHINE SULFATE 2 MG: 2 INJECTION, SOLUTION INTRAMUSCULAR; INTRAVENOUS at 23:15

## 2019-07-03 RX ADMIN — DOCUSATE SODIUM 100 MG: 100 CAPSULE, LIQUID FILLED ORAL at 17:03

## 2019-07-03 RX ADMIN — SODIUM CHLORIDE 125 ML/HR: 900 INJECTION, SOLUTION INTRAVENOUS at 00:58

## 2019-07-03 RX ADMIN — PROPOFOL 30 MG: 10 INJECTION, EMULSION INTRAVENOUS at 13:53

## 2019-07-03 RX ADMIN — SODIUM CHLORIDE 125 ML/HR: 900 INJECTION, SOLUTION INTRAVENOUS at 16:00

## 2019-07-03 RX ADMIN — PROPOFOL 20 MG: 10 INJECTION, EMULSION INTRAVENOUS at 13:56

## 2019-07-03 RX ADMIN — PROPOFOL 30 MG: 10 INJECTION, EMULSION INTRAVENOUS at 13:54

## 2019-07-03 RX ADMIN — PROPOFOL 20 MG: 10 INJECTION, EMULSION INTRAVENOUS at 13:57

## 2019-07-03 RX ADMIN — SODIUM CHLORIDE, SODIUM LACTATE, POTASSIUM CHLORIDE, CALCIUM CHLORIDE: 600; 310; 30; 20 INJECTION, SOLUTION INTRAVENOUS at 13:42

## 2019-07-03 RX ADMIN — Medication 10 ML: at 23:16

## 2019-07-03 NOTE — PROGRESS NOTES
Problem: Falls - Risk of  Goal: *Absence of Falls  Description  Document [place] Fall Risk and appropriate interventions in the flowsheet.   Outcome: Progressing Towards Goal     Problem: Patient Education: Go to Patient Education Activity  Goal: Patient/Family Education  Outcome: Progressing Towards Goal     Problem: Nutrition Deficit  Goal: *Optimize nutritional status  Outcome: Progressing Towards Goal

## 2019-07-03 NOTE — H&P
.  [facility] Progress Note             Date: 7/3/2019    GI Problem: Epigastric pain, bloating, SBO resolved, ileitis on CT negative on colonoscopy    History of Present Illness:  Patient is a 48 y.o. female who is seen in consultation for Epigastric pain, bloating, SBO resolved, ileitis on CT negative on colonoscopy. Hospital Medications:  No current facility-administered medications for this encounter. Objective:     Physical Exam:  Vitals: There were no vitals taken for this visit. General: No acute distress. Skin:  Extremities and face reveal no rashes. No gordillo erythema. No telangiectasias on the chest wall. HEENT: Sclerae anicteric. No oral ulcers. No abnormal pigmentation of the lips. The neck is supple. Cardiovascular: Regular rate and rhythm. No murmurs, gallops, or rubs. Respiratory:  Comfortable breathing  With no accessory muscle use. Clear breath sounds with no wheezes, rales, or rhonchi. GI:  Abdomen nondistended, soft, and nontender. Normal active bowel sounds. No enlargement of the liver or spleen. No masses palpable. Musculoskeletal:  No pitting edema of the lower legs. Extremities have good range of motion. Neurological:  Gross memory appears intact. Patient is alert and oriented. Psychiatric:  Mood appears appropriate with judgement intact. Lymphatic:  No cervical or supraclavicular adenopathy. Laboratory:    Recent Labs     07/03/19  0523   WBC 15.2*   RBC 3.91*   HGB 11.6*   HCT 37.3         Recent Labs     07/03/19  0523   *      K 3.4*   *   CO2 29   BUN 19   CREA 0.83   CA 8.0*     No results for input(s): PTP, INR, APTT in the last 72 hours.     No lab exists for component: INREXT  Recent Labs     07/03/19  0523   SGOT 51*   AP 62   ALB 2.8*   TP 5.3*       Assessment:       A 48 y.o. female with Epigastric pain, bloating, SBO resolved, ileitis on CT negative on colonoscopy      Plan:       EGD    Signed By: Page Lamb MD     July 3, 2019

## 2019-07-03 NOTE — PROCEDURES
Endoscopic Gastroduodenoscopy Procedure Note    Indications: Epigastric pain    Anesthesia/Sedation: MAC IV     Pre-Procedure Physical:    No current facility-administered medications for this encounter. Latex; Penicillins; Ciprofloxacin; Codeine; Hydrocodone; Keflex [cephalexin]; and Sulfa (sulfonamide antibiotics)    No data found. Exam      Airway: clear   Heart: normal S1and S2    Lungs: clear bilateral  Abdomen: soft, nontender, bowel sounds present and normal in all quads   Mental Status: awake, alert and oriented to person, place and time          Procedure Details     Informed consent was obtained for the procedure, including conscious sedation. Risks of pancreatitis, infection, perforation, hemorrhage, adverse drug reaction and aspiration were discussed. The patient was placed in the left lateral decubitus position. Based on the pre-procedure assessment, including review of the patient's medical history, medications, allergies, and review of systems, she had been deemed to be an appropriate candidate for conscious sedation; she was therefore sedated with the medications listed below. She was monitored continuously with ECG tracing, pulse oximetry, blood pressure monitoring, and direct observation. The EGD gastroscope was inserted into the mouth and advanced under direct vision to the second portion of the duodenum. A careful inspection was made as the gastroscope was withdrawn, including a retroflexed view of the proximal stomach; findings and interventions are described below. Appropriate photodocumentation was obtained. Findings:   Esophagus- Normal.  Stomach- Normal.  Duodenum- Normal.    Therapies: None    Specimens: None    Estimated Blood Loss: 0 cc           Complications:   None; patient tolerated the procedure well. Attending Attestation:  I performed the procedure. Impression:    Normal EGD. Recommendations:   Will sign off for now- please call back if further issues. Will arrange followup in office in 1-2 mos.

## 2019-07-03 NOTE — PROGRESS NOTES
Nutrition  Reason for assessment: Length of stay    Assessment:   Diet: DIET GI SOFT No options chosen    Food/Nutrition Patient History:  Pt presented with R sided abdominal pain and constipation; CT of the abdomen showed terminal ileitis resulting in SBO. Ileus improved with NG tube compression. Pt s/p EGD today with unremarkable results. Pt reports poor po intake ~5 days prior to admission r/t \"feeling bloated\". Prior to that time, pt endorses good po intake \"food junkie\"; states she has been gaining weight with #. Anthropometrics:Height: 5' 6\" (167.6 cm),  Weight: 52.6 kg (116 lb), Weight source not specified, Body mass index is 18.72 kg/m². BMI class of low normal range. Macronutrient needs:  EER:  2150-1401 kcal /day (30-35 kcal/kg BW)  EPR: 53-63 grams protein/day (1-1.2 grams/kg BW)  Intake/Comparative Standards: Pt has been NPO/Clear Liquids; this does not meet estimated kcal or protein needs. Pt states she tolerated GI soft diet today past EGD; hungry and requesting another meal and bedtime snack. Nutrition Diagnosis: Inadequate oral intake r/t decreased ability to consume sufficient oral intake as evidenced by NPO / Clear Liquids during most of hospitalization. Intervention:  Meals and snacks: Continue current diet. Nutrition Supplement Therapy: Pt declines at this time. Discharge nutrition plan: Too soon to determine. Coordination of Nutrition Care:  Interdisciplinary Rounds.     Lindsay Shetty, MPH, [facility],   [phone]

## 2019-07-03 NOTE — ANESTHESIA POSTPROCEDURE EVALUATION
Procedure(s):  ESOPHAGOGASTRODUODENOSCOPY (EGD) sfe/363.    total IV anesthesia    Anesthesia Post Evaluation      Multimodal analgesia: multimodal analgesia used between 6 hours prior to anesthesia start to PACU discharge  Patient location during evaluation: PACU  Patient participation: complete - patient participated  Level of consciousness: awake and alert  Pain management: adequate  Airway patency: patent  Anesthetic complications: no  Cardiovascular status: acceptable and hemodynamically stable  Respiratory status: acceptable  Hydration status: acceptable        Vitals Value Taken Time   /94 7/3/2019  2:28 PM   Temp 36.1 °C (97 °F) 7/3/2019  2:11 PM   Pulse 77 7/3/2019  2:29 PM   Resp 14 7/3/2019  2:16 PM   SpO2 100 % 7/3/2019  2:29 PM   Vitals shown include unvalidated device data.

## 2019-07-03 NOTE — PROGRESS NOTES
TRANSFER - OUT REPORT:    Verbal report given to Jemima Marcum RN(name) on Alexa Snow  being transferred to GI lab(unit) for routine progression of care    Report consisted of patients Situation, Background, Assessment and   Recommendations(SBAR). Information from the following report(s) SBAR was reviewed with the receiving nurse. Opportunity for questions and clarification was provided.       Patient transported with:   O2 @ 1 liters

## 2019-07-03 NOTE — PROGRESS NOTES
[facility] Progress Note         Admit Date:  6/27/2019    Today's Date:  7/3/2019    CC:  SBO associated with terminal ileitis, chest pain, epi pain, bloating    Subjective:     Patient continues to have soreness over the lower substernal area, epigastric area, and radiating into the left chest and LUQ, as well as over her abdomen. She has had increased bloating over the lower substernal area and epigastric area off and on, vega after eating. She is not having as much discomfort this am.  She denies any nausea, vomiting, or bleeding. She has not had BM today. She has a history of smoking, off since this admission, and methamphetamine use - last use about 3 weeks ago. Medications:   Current Facility-Administered Medications   Medication Dose Route Frequency    pantoprazole (PROTONIX) tablet 40 mg  40 mg Oral ACB    simethicone (MYLICON) tablet 80 mg  80 mg Oral QID PRN    docusate sodium (COLACE) capsule 100 mg  100 mg Oral BID    sodium chloride (NS) flush 5-40 mL  5-40 mL IntraVENous Q8H    sodium chloride (NS) flush 5-40 mL  5-40 mL IntraVENous PRN    diphenhydrAMINE (BENADRYL) injection 12.5 mg  12.5 mg IntraVENous Q4H PRN    ondansetron (ZOFRAN) injection 4 mg  4 mg IntraVENous Q4H PRN    acetaminophen (TYLENOL) solution 650 mg  650 mg Oral Q4H PRN    morphine injection 2 mg  2 mg IntraVENous Q3H PRN    naloxone (NARCAN) injection 0.4 mg  0.4 mg IntraVENous PRN    bisacodyl (DULCOLAX) suppository 10 mg  10 mg Rectal DAILY    hydrALAZINE (APRESOLINE) 20 mg/mL injection 10 mg  10 mg IntraVENous Q6H PRN    0.9% sodium chloride infusion  125 mL/hr IntraVENous CONTINUOUS    phenol throat spray (CHLORASEPTIC) 1 Spray  1 Spray Oral PRN       Review of Systems:  ROS was obtained, with pertinent positives as listed above. No SOB.     Diet:  NPO    Objective:   Vitals:  Visit Vitals  BP (!) 88/53 (BP 1 Location: Right arm, BP Patient Position: At rest;Lying left side) Comment: RN CAMRYN Notified   Pulse (!) 58 Comment: RN CAMRYN Notified   Temp 99 °F (37.2 °C)   Resp 16   Ht 5' 6\" (1.676 m)   Wt 52.6 kg (116 lb)   SpO2 98%   BMI 18.72 kg/m²     Intake/Output:  No intake/output data recorded. 07/01 1901 - 07/03 0700  In: 4785 [I.V.:4785]  Out: 400 [Urine:400]  Exam:  General appearance: alert, cooperative, no distress, lying in bed, she had O2 NC hanging around neck and states she has not needed it  Lungs: clear to auscultation bilaterally anteriorly  Heart: regular rate and rhythm  Abdomen: soft, mildly tender over abdomen. Bowel sounds normal. No masses, no organomegaly  Neuro:  alert and oriented    Data Review (Labs):    Recent Labs     07/03/19  0523   WBC 15.2*   HGB 11.6*   HCT 37.3      MCV 95.4      K 3.4*   *   CO2 29   BUN 19   CREA 0.83   CA 8.0*   *   AP 62   SGOT 51*   *   TBILI 0.3   ALB 2.8*   TP 5.3*      Ref. Range 6/27/2019 20:59 6/29/2019 03:53 6/30/2019 04:58   WBC Latest Ref Range: 4.3 - 11.1 K/uL 15.4 (H) 8.6 6.5   RBC Latest Ref Range: 4.05 - 5.2 M/uL 5.15 3.87 (L) 3.90 (L)   HGB Latest Ref Range: 11.7 - 15.4 g/dL 15.7 (H) 11.8 11.8   HCT Latest Ref Range: 35.8 - 46.3 % 49.0 (H) 37.2 37.3   MCV Latest Ref Range: 79.6 - 97.8 FL 95.1 96.1 95.6   MCH Latest Ref Range: 26.1 - 32.9 PG 30.5 30.5 30.3   MCHC Latest Ref Range: 31.4 - 35.0 g/dL 32.0 31.7 31.6   RDW Latest Ref Range: 11.9 - 14.6 % 14.0 14.2 13.7   PLATELET Latest Ref Range: 150 - 450 K/uL 363 268 269      Ref. Range 6/28/2019 00:31   Lactic Acid (POC) Latest Ref Range: 0.5 - 1.9 mmol/L 1.37      Ref. Range 7/1/2019 16:15   Troponin-I, Qt. Latest Ref Range: 0.02 - 0.05 NG/ML <0.02 (L)      Ref.  Range 6/27/2019 20:59 6/28/2019 11:03 6/29/2019 03:53 6/30/2019 04:58   Sodium Latest Ref Range: 136 - 145 mmol/L 138 142 141 142   Potassium Latest Ref Range: 3.5 - 5.1 mmol/L 4.3 4.3 3.8 3.8   Chloride Latest Ref Range: 98 - 107 mmol/L 101 107 107 109 (H)   CO2 Latest Ref Range: 21 - 32 mmol/L 33 (H) 28 29 26   Anion gap Latest Ref Range: 7 - 16 mmol/L 4 (L) 7 5 (L) 7   Glucose Latest Ref Range: 65 - 100 mg/dL 114 (H) 108 (H) 100 142 (H)   BUN Latest Ref Range: 6 - 23 MG/DL 17 16 13 9   Creatinine Latest Ref Range: 0.6 - 1.0 MG/DL 0.85 0.89 0.77 0.66   Calcium Latest Ref Range: 8.3 - 10.4 MG/DL 9.8 8.6 8.0 (L) 8.3   Magnesium Latest Ref Range: 1.8 - 2.4 mg/dL   2.4    GFR est non-AA Latest Ref Range: >60 ml/min/1.73m2 >60 >60 >60 >60   GFR est AA Latest Ref Range: >60 ml/min/1.73m2 >60 >60 >60 >60   Bilirubin, total Latest Ref Range: 0.2 - 1.1 MG/DL 0.3      Protein, total Latest Ref Range: 6.3 - 8.2 g/dL 7.8      Albumin Latest Ref Range: 3.5 - 5.0 g/dL 4.3      Globulin Latest Ref Range: 2.3 - 3.5 g/dL 3.5      A-G Ratio Latest Ref Range: 1.2 - 3.5   1.2      ALT (SGPT) Latest Ref Range: 12 - 65 U/L 44      AST Latest Ref Range: 15 - 37 U/L 22      Alk. phosphatase Latest Ref Range: 50 - 136 U/L 88        CULTURE, STOOL [[id number]] (Order [id number]) Microbiology   Date: 6/28/2019 Department: [facility]  Med Surg Released By/Authorizing: Dilcia Umana MD (auto-released)   Specimen Information: Stool        Component Value Flag Ref Range Units Status   Special Requests:      Final   NO SPECIAL REQUESTS    Culture result:      Final   No Salmonella, Shigella, or Ecoli 0157 isolated. CT ABDOMEN AND PELVIS WITH CONTRAST 27 June 2019  HISTORY: Abdominal pain   COMPARISON: None   TECHNIQUE: Helical imaging was performed from the lung bases through the ischial  tuberosities during the intravenous infusion of 100 cc of Isovue-370. Oral  contrast was administered. Coronal and sagittal reformats were performed.   Dose reduction techniques used: Automated exposure control, adjustment of the  mAs and/or kVp according to patient's size, and iterative reconstruction  techniques.   FINDINGS:  *  LUNG BASES: Within normal limits. *  LIVER: Within normal limits.   *  GALLBLADDER AND BILE DUCTS: Normal.  * SPLEEN: Within normal limits. *  URINARY TRACT: Within normal limits. *  ADRENALS: Within normal limits. *  PANCREAS: Within normal limits. *  GASTROINTESTINAL TRACT: Thickened, edematous and hyperenhancing mucosa of the  terminal ileum with a proximal small bowel obstruction. *  RETROPERITONEUM: Within normal limits. *  PERITONEAL CAVITY AND ABDOMINAL WALL: Within normal limits. *  PELVIS: Within normal limits. *  SPINE / BONES: Within normal limits. *  OTHER COMMENTS: None.   IMPRESSION  IMPRESSION:   Terminal ileitis resulting in a small bowel obstruction. If chronic this could  represent the presence of a stricture. Consider the possibility of infection  Crohn's disease in backwash ileitis from ulcerative colitis is the most likely  possibilities.   Date of Dictation: 6/27/2019 11:59 PM    Acute Abd Series 1 July 2019  Clinical History: The patient is a 48years year old Female presenting with  symptoms of bowel obstruction, follow up   Comparison:  None  Findings: The lungs are free of acute infiltrate. There is no pleural effusion  or pneumothorax. The cardiomediastinal silhouette is within normal limits. There is no acute osseous abnormality.    Mild residual gaseous distention of mid small bowel loops is demonstrated. Oral  contrast is seen throughout the colon. There is no soft tissue mass or  organomegaly. No abnormal calcifications are identified. There is no free  intraperitoneal air. No acute osseous abnormality is demonstrated.     IMPRESSION  Impression:    1. No acute cardiopulmonary disease. 2.  Mild residual gaseous distention of mid small bowel.     7/2/2019  9:49 AM - [place], Card Result In   Component Value Ref Range & Units Status   Ventricular Rate 41  BPM Final   Atrial Rate 41  BPM Final   P-R Interval 166  ms Final   QRS Duration 84  ms Final   Q-T Interval 514  ms Final   QTC Calculation (Bezet) 424  ms Final   Calculated P Axis 57  degrees Final   Calculated R Axis 53 degrees Final   Calculated T Axis 70  degrees Final   Diagnosis     Final   Marked sinus bradycardia   Possible Left atrial enlargement   Abnormal ECG   No previous ECGs available   Confirmed by [facility] MD (), KAMILA CHENG ([id number]) on 7/2/2019 9:48:59 AM      Colonoscopy 2 July 2019 with Dr. May Urbano with Postoperative Diagnosis: sigmoid polyps (3), rectal polyp, sigmoid diverticulosis, internal hemorrhoids. Terminal ileum and colon without findings of inflammation or Crohn's. Recommendations:   Routine post colonoscopy polypectomy instructions.   Start GI soft diet. Conservative management of diverticulosis and hemorrhoids. Colon screening based on pathology of polyps removed. Further recommendations per Dr. Walker Males and inpatient rounding team.    Campylobacter, stool culture, lactoferrin, TB Quantiferon gold pending    Assessment:     Principal Problem:    SBO (small bowel obstruction) ([facility] [address] (6/28/2019)    Active Problems:    Terminal ileitis ([address] (6/28/2019)      Leukocytosis (6/28/2019)    47 yo female with PMH of hysterectomy, who was seen in consultation 28 June 2019 for SBO associated with terminal ileitis, after presented with abdominal pain, N/V. She has a history of chronic constipation, which worsened with the abd pain, n/v.  Abdominal series in the ED revealed a prominent loop of small bowel in the mid abdomen - ileus versus obstruction. Subsequent CT scan showed thickened, edematous, and hyperenhancing mucosa of the terminal ileum with associated small bowel obstruction - improved after NG tube decompression, clinically resolved at this point. She has had recent chest pain with lower substernal and epi pain, with associated bloating. She had colonoscopy 2 July 2019 with colon and rectal polyps, but no sign of inflammation or Crohn's. She has had increase in WBCs, possibly related to steroids, but has also had increase in LFTs. She has history of tobacco and methamphetamine use.   Concern for possible other infection, inflammation, liver, biliary, GB etiology. EKG and troponin were negative. Plan:     -Supportive care, IVF.  -NPO. -RUQ US this morning to assess for any liver, GB, biliary etiology. -EGD later today to assess for inflammation, ulceration. Discussed risks including bleeding, perforation, IV complications, sedation risks, and pt states understanding and agrees to proceed. -Protonix 40 mg po daily.  -Follow labs. -Avoid tobacco, ETOH, and drug use. -Follow. Patient is seen and examined in collaboration with Dr. Óscar Collier. Assessment and plan as per Dr. Franky Araiza. Geno Wilson Client  [facility]

## 2019-07-03 NOTE — PROGRESS NOTES
TRANSFER - OUT REPORT:    Verbal report given to RAZ Johnson(name) on Jada Arias  being transferred to Med-Surg Unit SFE Room 363(unit) for routine progression of care       Report consisted of patients Situation, Background, Assessment and   Recommendations(SBAR). Information from the following report(s) SBAR, Procedure Summary, Intake/Output, MAR and Cardiac Rhythm SB  was reviewed with the receiving nurse. Opportunity for questions and clarification was provided.       Patient transported with:   [employer]   [facility] [employer]

## 2019-07-03 NOTE — PROGRESS NOTES
Problem: Falls - Risk of  Goal: *Absence of Falls  Description  Document Hailey Ulrich Fall Risk and appropriate interventions in the flowsheet.   Outcome: Progressing Towards Goal     Problem: Small Bowel Obstruction: Day 2  Goal: Activity/Safety  Outcome: Progressing Towards Goal  Goal: *Absence of nausea/vomiting  Outcome: Progressing Towards Goal

## 2019-07-03 NOTE — ANESTHESIA PREPROCEDURE EVALUATION
Relevant Problems   No relevant active problems       Anesthetic History               Review of Systems / Medical History  Patient summary reviewed    Pulmonary          Smoker         Neuro/Psych              Cardiovascular    Hypertension (no meds)              Exercise tolerance: >4 METS     GI/Hepatic/Renal                Endo/Other             Other Findings   Comments: SBO, resolved, no current nausea,            Physical Exam    Airway  Mallampati: II  TM Distance: > 6 cm  Neck ROM: normal range of motion   Mouth opening: Normal     Cardiovascular  Regular rate and rhythm,  S1 and S2 normal,  no murmur, click, rub, or gallop  Rhythm: regular  Rate: normal         Dental  No notable dental hx       Pulmonary  Breath sounds clear to auscultation               Abdominal         Other Findings            Anesthetic Plan    ASA: 2  Anesthesia type: total IV anesthesia          Induction: Intravenous  Anesthetic plan and risks discussed with: Patient      Did well with colonoscopy yesterday

## 2019-07-04 VITALS
HEART RATE: 68 BPM | WEIGHT: 116 LBS | DIASTOLIC BLOOD PRESSURE: 81 MMHG | TEMPERATURE: 97.8 F | SYSTOLIC BLOOD PRESSURE: 120 MMHG | HEIGHT: 66 IN | OXYGEN SATURATION: 96 % | RESPIRATION RATE: 17 BRPM | BODY MASS INDEX: 18.64 KG/M2

## 2019-07-04 PROCEDURE — 74011250637 HC RX REV CODE- 250/637: Performed by: INTERNAL MEDICINE

## 2019-07-04 PROCEDURE — 74011250636 HC RX REV CODE- 250/636: Performed by: INTERNAL MEDICINE

## 2019-07-04 PROCEDURE — 77030020263 HC SOL INJ SOD CL0.9% LFCR 1000ML

## 2019-07-04 RX ORDER — PANTOPRAZOLE SODIUM 40 MG/1
40 TABLET, DELAYED RELEASE ORAL
Qty: 30 TAB | Refills: 0 | Status: SHIPPED | OUTPATIENT
Start: 2019-07-05

## 2019-07-04 RX ORDER — DOCUSATE SODIUM 100 MG/1
100 CAPSULE, LIQUID FILLED ORAL
Qty: 60 CAP | Refills: 2 | Status: SHIPPED | OUTPATIENT
Start: 2019-07-04 | End: 2019-08-03

## 2019-07-04 RX ADMIN — BISACODYL 10 MG: 10 SUPPOSITORY RECTAL at 08:56

## 2019-07-04 RX ADMIN — PANTOPRAZOLE SODIUM 40 MG: 40 TABLET, DELAYED RELEASE ORAL at 08:56

## 2019-07-04 RX ADMIN — Medication 10 ML: at 06:00

## 2019-07-04 RX ADMIN — DOCUSATE SODIUM 100 MG: 100 CAPSULE, LIQUID FILLED ORAL at 08:56

## 2019-07-04 RX ADMIN — SODIUM CHLORIDE 125 ML/HR: 900 INJECTION, SOLUTION INTRAVENOUS at 03:31

## 2019-07-04 NOTE — PROGRESS NOTES
Patient given discharge instructions and prescriptions and opportunity for questions given. Iv removed with tip intact. Patient to d/c when ride arrives.

## 2019-07-04 NOTE — DISCHARGE INSTRUCTIONS
DISCHARGE SUMMARY from Nurse    PATIENT INSTRUCTIONS:    After general anesthesia or intravenous sedation, for 24 hours or while taking prescription Narcotics:  · Limit your activities  · Do not drive and operate hazardous machinery  · Do not make important personal or business decisions  · Do  not drink alcoholic beverages  · If you have not urinated within 8 hours after discharge, please contact your surgeon on call. Report the following to your surgeon:  · Excessive pain, swelling, redness or odor of or around the surgical area  · Temperature over 100.5  · Nausea and vomiting lasting longer than 4 hours or if unable to take medications  · Any signs of decreased circulation or nerve impairment to extremity: change in color, persistent  numbness, tingling, coldness or increase pain  · Any questions    What to do at Home:  Recommended activity: Activity as tolerated, do not drive while under sedating medication. If you experience any of the following symptoms nausea, vomiting, shortness of breath, please follow up with MD.    *  Please give a list of your current medications to your Primary Care Provider. *  Please update this list whenever your medications are discontinued, doses are      changed, or new medications (including over-the-counter products) are added. *  Please carry medication information at all times in case of emergency situations. These are general instructions for a healthy lifestyle:    No smoking/ No tobacco products/ Avoid exposure to second hand smoke  Surgeon General's Warning:  Quitting smoking now greatly reduces serious risk to your health.     Obesity, smoking, and sedentary lifestyle greatly increases your risk for illness    A healthy diet, regular physical exercise & weight monitoring are important for maintaining a healthy lifestyle    You may be retaining fluid if you have a history of heart failure or if you experience any of the following symptoms:  Weight gain of 3 pounds or more overnight or 5 pounds in a week, increased swelling in our hands or feet or shortness of breath while lying flat in bed. Please call your doctor as soon as you notice any of these symptoms; do not wait until your next office visit. The discharge information has been reviewed with the patient. The patient verbalized understanding. Discharge medications reviewed with the patient and appropriate educational materials and side effects teaching were provided.   ___________________________________________________________________________________________________________________________________

## 2019-07-04 NOTE — DISCHARGE SUMMARY
Hospitalist Discharge Summary     Patient ID:  Aiden Dumont  [id number]  38 y.o.  1965  Admit date: 6/27/2019  9:13 PM  Discharge date and time: 7/4/2019  Attending: Griselda Dodson MD  PCP:  Crystal Kang MD  Treatment Team: Attending Provider: Griselda Dodson MD; Care Manager: Jose Lombardi RN; : Asiya Marquez; Utilization Review: Graciela Wilson RN    Principal Diagnosis SBO (small bowel obstruction) [facility])   Principal Problem:    SBO (small bowel obstruction) ([facility] (6/28/2019)    Active Problems:    Terminal ileitis ([facility] (6/28/2019)      Leukocytosis (6/28/2019)             Hospital Course:  Please refer to the admission H&P for details of presentation. In summary, the patient is  54F admitted for abdominal pain and constipation. Found to have small bowel obstruction with concern for ileitis. Started on antibiotics and IV steroids. GI consulted. Ileus improved after NG tube decompression. Colonoscopy on 2 July with colon and rectal polypys but w/o signs of inflammation or Crohn's. She reported continued epigastric pain and EGD performed 3 July was unremarkable. RUQ US unremarkable. Patient is having normal bowel movements and tolerating GI bland diet. She is medically stable for discharge home. Will need outpatient follow-up with GI. Significant Diagnostic Studies:   Final [99] 7/03/2019 09:23 7/03/2019 09:38   Study Result     Right upper quadrant abdominal ultrasound     History: elevated LFTs, 54 years Female     Comparison: None available.     Findings: The liver is normal in echotexture and there is no dilatation of the  intrahepatic ducts or evidence of focal mass. The liver measures 15.8 cm in  midclavicular length. Normal hepatopedal flow within the main portal vein. The  gallbladder is normal-appearing. No evidence of cholelithiasis, gallbladder wall  thickening, or pericholecystic fluid. Negative sonographic Valladares sign.  The  proximal common duct is not dilated and measures 4 mm. The right kidney is  normal appearing and measures 10.6 cm in length. Partially visualized head and  body of the pancreas appear unremarkable. The visualized portions of the IVC and  aorta are unremarkable. No ascites or right pleural effusion.     IMPRESSION  Impression: Normal study. Final [99] 7/01/2019 14:43 7/01/2019 14:51   Study Result     Clinical History: The patient is a 48years year old Female presenting with  symptoms of bowel obstruction, follow up     Comparison:  None     Findings: The lungs are free of acute infiltrate. There is no pleural effusion  or pneumothorax. The cardiomediastinal silhouette is within normal limits. There is no acute osseous abnormality.      Mild residual gaseous distention of mid small bowel loops is demonstrated. Oral  contrast is seen throughout the colon. There is no soft tissue mass or  organomegaly. No abnormal calcifications are identified. There is no free  intraperitoneal air. No acute osseous abnormality is demonstrated.       IMPRESSION  Impression:      1. No acute cardiopulmonary disease. 2.  Mild residual gaseous distention of mid small bowel.     CPT code(s) 04659,02285           KUB     INDICATION: Partial small bowel obstruction     COMPARISON: 06/27/2019     FINDINGS: 2 portable AP supine images were submitted. There is faint contrast  material in the right and transverse colon. Air distended lower abdominal and  upper pelvic small bowel loops are stable. Small amount of air in the rectum. No  suspicious masses or calcifications. Limited assessment for free air given  submitted images.     IMPRESSION  IMPRESSION: Stable bowel gas pattern.  Partial small bowel obstruction favored    Status Exam Begun  Exam Ended    Final [99] 6/27/2019 23:41 6/27/2019 23:46   Study Result     CT ABDOMEN AND PELVIS WITH CONTRAST     HISTORY: Abdominal pain     COMPARISON: None     TECHNIQUE: Helical imaging was performed from the lung bases through the ischial  tuberosities during the intravenous infusion of 100 cc of Isovue-370. Oral  contrast was administered. Coronal and sagittal reformats were performed.     Dose reduction techniques used: Automated exposure control, adjustment of the  mAs and/or kVp according to patient's size, and iterative reconstruction  techniques.     FINDINGS:  *  LUNG BASES: Within normal limits. *  LIVER: Within normal limits. *  GALLBLADDER AND BILE DUCTS: Normal.  *  SPLEEN: Within normal limits. *  URINARY TRACT: Within normal limits. *  ADRENALS: Within normal limits. *  PANCREAS: Within normal limits. *  GASTROINTESTINAL TRACT: Thickened, edematous and hyperenhancing mucosa of the  terminal ileum with a proximal small bowel obstruction. *  RETROPERITONEUM: Within normal limits. *  PERITONEAL CAVITY AND ABDOMINAL WALL: Within normal limits. *  PELVIS: Within normal limits. *  SPINE / BONES: Within normal limits. *  OTHER COMMENTS: None.     IMPRESSION  IMPRESSION:     Terminal ileitis resulting in a small bowel obstruction. If chronic this could  represent the presence of a stricture. Consider the possibility of infection  Crohn's disease in [facility] ileitis from ulcerative colitis is the most likely  possibilities.            Labs: Results:       Chemistry Recent Labs     07/03/19  0523   *      K 3.4*   *   CO2 29   BUN 19   CREA 0.83   CA 8.0*   AGAP 5*   AP 62   TP 5.3*   ALB 2.8*   GLOB 2.5   AGRAT 1.1*      CBC w/Diff Recent Labs     07/03/19  0523   WBC 15.2*   RBC 3.91*   HGB 11.6*   HCT 37.3         Cardiac Enzymes No results for input(s): CPK, CKND1, KELL in the last 72 hours. No lab exists for component: CKRMB, TROIP   Coagulation No results for input(s): PTP, INR, APTT in the last 72 hours.     No lab exists for component: INREXT    Lipid Panel No results found for: CHOL, CHOLPOCT, CHOLX, CHLST, CHOLV, 273436, HDL, LDL, LDLC, DLDLP, 153922, VLDLC, VLDL, TGLX, TRIGL, TRIGP, TGLPOCT, CHHD, CHHDX   BNP No results for input(s): BNPP in the last 72 hours. Liver Enzymes Recent Labs     07/03/19  0523   TP 5.3*   ALB 2.8*   AP 62   SGOT 51*      Thyroid Studies No results found for: T4, T3U, TSH, TSHEXT         Discharge Exam:  Visit Vitals  /70 (BP 1 Location: Left arm, BP Patient Position: At rest;Supine)   Pulse (!) 55 Comment: RN FREEMAN Notified   Temp 96.9 °F (36.1 °C)   Resp 15   Ht 5' 6\" (1.676 m)   Wt 52.6 kg (116 lb)   SpO2 95%   BMI 18.72 kg/m²     General appearance: alert, cooperative, no distress, appears stated age  Lungs: clear to auscultation bilaterally  Heart: regular rate and rhythm, S1, S2 normal, no murmur, click, rub or gallop  Abdomen: soft, nontender to palpation this morning. Bowel sounds normal. No masses,  no organomegaly  Extremities: no cyanosis or edema  Neurologic: Grossly normal     Disposition: home   Discharge Condition: stable  Patient Instructions:   Current Discharge Medication List      START taking these medications    Details   docusate sodium (COLACE) 100 mg capsule Take 1 Cap by mouth two (2) times daily as needed for Constipation for up to 30 days. Qty: 60 Cap, Refills: 2      pantoprazole (PROTONIX) 40 mg tablet Take 1 Tab by mouth Daily (before breakfast).   Qty: 30 Tab, Refills: 0             Activity:as tolerated  Diet: GI bland      Follow-up  ·   PCP in 1 week, GI in 1-2 weeks  Time spent to discharge patient 35 minutes  Signed:  France Huizar DO  7/4/2019  9:14 AM

## 2019-07-05 LAB
BACTERIA SPEC CULT: NORMAL
CAMPYLOBACTER STL CULT: NORMAL
RESULT 1, 080259: NORMAL
SPECIMEN SOURCE: NORMAL
SPECIMEN SOURCE: NORMAL
YERSINIA SPEC CULT: NORMAL

## 2022-03-18 PROBLEM — D72.829 LEUKOCYTOSIS: Status: ACTIVE | Noted: 2019-06-28

## 2022-03-19 PROBLEM — K50.00 TERMINAL ILEITIS (HCC): Status: ACTIVE | Noted: 2019-06-28

## 2022-03-19 PROBLEM — K56.609 SBO (SMALL BOWEL OBSTRUCTION) (HCC): Status: ACTIVE | Noted: 2019-06-28

## 2022-09-26 NOTE — PROGRESS NOTES
Pt is resting in bed. States she has been getting herself up to BR. Pt encouraged to call if feels dizziness b/c we do not want her to fall. No distress noted. Mastoid Interpolation Flap Text: A decision was made to reconstruct the defect utilizing an interpolation axial flap and a staged reconstruction.  A telfa template was made of the defect.  This telfa template was then used to outline the mastoid interpolation flap.  The donor area for the pedicle flap was then injected with anesthesia.  The flap was excised through the skin and subcutaneous tissue down to the layer of the underlying musculature.  The pedicle flap was carefully excised within this deep plane to maintain its blood supply.  The edges of the donor site were undermined.   The donor site was closed in a primary fashion.  The pedicle was then rotated into position and sutured.  Once the tube was sutured into place, adequate blood supply was confirmed with blanching and refill.  The pedicle was then wrapped with xeroform gauze and dressed appropriately with a telfa and gauze bandage to ensure continued blood supply and protect the attached pedicle.

## 2023-03-13 ENCOUNTER — APPOINTMENT (OUTPATIENT)
Dept: CT IMAGING | Age: 58
End: 2023-03-13

## 2023-03-13 ENCOUNTER — HOSPITAL ENCOUNTER (EMERGENCY)
Age: 58
Discharge: HOME | End: 2023-03-14
Attending: EMERGENCY MEDICINE

## 2023-03-13 DIAGNOSIS — R10.84 GENERALIZED ABDOMINAL PAIN: Primary | ICD-10-CM

## 2023-03-13 DIAGNOSIS — K56.609 SBO (SMALL BOWEL OBSTRUCTION) (HCC): ICD-10-CM

## 2023-03-13 DIAGNOSIS — Z90.49 S/P CHOLECYSTECTOMY: ICD-10-CM

## 2023-03-13 LAB
ALBUMIN SERPL-MCNC: 4 G/DL (ref 3.5–5)
ALBUMIN/GLOB SERPL: 1.3 (ref 0.4–1.6)
ALP SERPL-CCNC: 77 U/L (ref 50–130)
ALT SERPL-CCNC: 51 U/L (ref 12–65)
ANION GAP SERPL CALC-SCNC: 2 MMOL/L (ref 2–11)
AST SERPL-CCNC: 34 U/L (ref 15–37)
BASOPHILS # BLD: 0.1 K/UL (ref 0–0.2)
BASOPHILS NFR BLD: 0 % (ref 0–2)
BILIRUB SERPL-MCNC: 0.5 MG/DL (ref 0.2–1.1)
BUN SERPL-MCNC: 19 MG/DL (ref 6–23)
CALCIUM SERPL-MCNC: 9.4 MG/DL (ref 8.3–10.4)
CHLORIDE SERPL-SCNC: 108 MMOL/L (ref 101–110)
CO2 SERPL-SCNC: 34 MMOL/L (ref 21–32)
CREAT SERPL-MCNC: 1.19 MG/DL (ref 0.6–1)
DIFFERENTIAL METHOD BLD: ABNORMAL
EOSINOPHIL # BLD: 0.1 K/UL (ref 0–0.8)
EOSINOPHIL NFR BLD: 0 % (ref 0.5–7.8)
ERYTHROCYTE [DISTWIDTH] IN BLOOD BY AUTOMATED COUNT: 14.5 % (ref 11.9–14.6)
GLOBULIN SER CALC-MCNC: 3.1 G/DL (ref 2.8–4.5)
GLUCOSE SERPL-MCNC: 95 MG/DL (ref 65–100)
HCT VFR BLD AUTO: 37.5 % (ref 35.8–46.3)
HGB BLD-MCNC: 12.3 G/DL (ref 11.7–15.4)
IMM GRANULOCYTES # BLD AUTO: 0.1 K/UL (ref 0–0.5)
IMM GRANULOCYTES NFR BLD AUTO: 0 % (ref 0–5)
LIPASE SERPL-CCNC: 331 U/L (ref 73–393)
LYMPHOCYTES # BLD: 6.5 K/UL (ref 0.5–4.6)
LYMPHOCYTES NFR BLD: 43 % (ref 13–44)
MCH RBC QN AUTO: 31.1 PG (ref 26.1–32.9)
MCHC RBC AUTO-ENTMCNC: 32.8 G/DL (ref 31.4–35)
MCV RBC AUTO: 94.7 FL (ref 82–102)
MONOCYTES # BLD: 0.8 K/UL (ref 0.1–1.3)
MONOCYTES NFR BLD: 6 % (ref 4–12)
NEUTS SEG # BLD: 7.6 K/UL (ref 1.7–8.2)
NEUTS SEG NFR BLD: 50 % (ref 43–78)
NRBC # BLD: 0 K/UL (ref 0–0.2)
PLATELET # BLD AUTO: 285 K/UL (ref 150–450)
PMV BLD AUTO: 10.2 FL (ref 9.4–12.3)
POTASSIUM SERPL-SCNC: 3.8 MMOL/L (ref 3.5–5.1)
PROT SERPL-MCNC: 7.1 G/DL (ref 6.3–8.2)
RBC # BLD AUTO: 3.96 M/UL (ref 4.05–5.2)
SODIUM SERPL-SCNC: 144 MMOL/L (ref 133–143)
WBC # BLD AUTO: 15.2 K/UL (ref 4.3–11.1)

## 2023-03-13 PROCEDURE — 83690 ASSAY OF LIPASE: CPT

## 2023-03-13 PROCEDURE — 96375 TX/PRO/DX INJ NEW DRUG ADDON: CPT

## 2023-03-13 PROCEDURE — 74176 CT ABD & PELVIS W/O CONTRAST: CPT

## 2023-03-13 PROCEDURE — 85025 COMPLETE CBC W/AUTO DIFF WBC: CPT

## 2023-03-13 PROCEDURE — 6360000002 HC RX W HCPCS: Performed by: EMERGENCY MEDICINE

## 2023-03-13 PROCEDURE — 96374 THER/PROPH/DIAG INJ IV PUSH: CPT

## 2023-03-13 PROCEDURE — 80053 COMPREHEN METABOLIC PANEL: CPT

## 2023-03-13 PROCEDURE — 99284 EMERGENCY DEPT VISIT MOD MDM: CPT

## 2023-03-13 PROCEDURE — 2580000003 HC RX 258: Performed by: EMERGENCY MEDICINE

## 2023-03-13 RX ORDER — 0.9 % SODIUM CHLORIDE 0.9 %
1000 INTRAVENOUS SOLUTION INTRAVENOUS
Status: COMPLETED | OUTPATIENT
Start: 2023-03-13 | End: 2023-03-14

## 2023-03-13 RX ORDER — MORPHINE SULFATE 2 MG/ML
2 INJECTION, SOLUTION INTRAMUSCULAR; INTRAVENOUS ONCE
Status: COMPLETED | OUTPATIENT
Start: 2023-03-13 | End: 2023-03-13

## 2023-03-13 RX ORDER — ONDANSETRON 2 MG/ML
4 INJECTION INTRAMUSCULAR; INTRAVENOUS
Status: COMPLETED | OUTPATIENT
Start: 2023-03-13 | End: 2023-03-13

## 2023-03-13 RX ADMIN — ONDANSETRON 4 MG: 2 INJECTION INTRAMUSCULAR; INTRAVENOUS at 23:23

## 2023-03-13 RX ADMIN — MORPHINE SULFATE 2 MG: 2 INJECTION, SOLUTION INTRAMUSCULAR; INTRAVENOUS at 23:23

## 2023-03-13 RX ADMIN — SODIUM CHLORIDE 1000 ML: 9 INJECTION, SOLUTION INTRAVENOUS at 23:23

## 2023-03-13 ASSESSMENT — PAIN DESCRIPTION - LOCATION
LOCATION: ABDOMEN
LOCATION: ABDOMEN

## 2023-03-13 ASSESSMENT — PAIN SCALES - GENERAL
PAINLEVEL_OUTOF10: 5
PAINLEVEL_OUTOF10: 7

## 2023-03-14 VITALS
DIASTOLIC BLOOD PRESSURE: 68 MMHG | RESPIRATION RATE: 16 BRPM | SYSTOLIC BLOOD PRESSURE: 114 MMHG | OXYGEN SATURATION: 96 % | HEART RATE: 78 BPM | TEMPERATURE: 97.8 F

## 2023-03-14 PROCEDURE — 6370000000 HC RX 637 (ALT 250 FOR IP): Performed by: EMERGENCY MEDICINE

## 2023-03-14 RX ORDER — OXYCODONE HYDROCHLORIDE 5 MG/1
10 TABLET ORAL
Status: COMPLETED | OUTPATIENT
Start: 2023-03-14 | End: 2023-03-14

## 2023-03-14 RX ORDER — PROMETHAZINE HYDROCHLORIDE 25 MG/1
25 TABLET ORAL
Status: COMPLETED | OUTPATIENT
Start: 2023-03-14 | End: 2023-03-14

## 2023-03-14 RX ORDER — DICYCLOMINE HYDROCHLORIDE 10 MG/1
20 CAPSULE ORAL
Status: COMPLETED | OUTPATIENT
Start: 2023-03-14 | End: 2023-03-14

## 2023-03-14 RX ADMIN — PROMETHAZINE HYDROCHLORIDE 25 MG: 25 TABLET ORAL at 01:23

## 2023-03-14 RX ADMIN — DICYCLOMINE HYDROCHLORIDE 20 MG: 10 CAPSULE ORAL at 01:24

## 2023-03-14 RX ADMIN — OXYCODONE HYDROCHLORIDE 10 MG: 5 TABLET ORAL at 01:24

## 2023-03-14 ASSESSMENT — ENCOUNTER SYMPTOMS
CONSTIPATION: 0
BACK PAIN: 0
BLOOD IN STOOL: 0
ABDOMINAL PAIN: 1
SHORTNESS OF BREATH: 0
DIARRHEA: 0
COUGH: 0
FACIAL SWELLING: 0
RHINORRHEA: 0
NAUSEA: 1
EYE PAIN: 0
ABDOMINAL DISTENTION: 1
VOMITING: 0
EYE REDNESS: 0
EYE DISCHARGE: 0
RECTAL PAIN: 0
STRIDOR: 0
WHEEZING: 0
ANAL BLEEDING: 0

## 2023-03-14 ASSESSMENT — PAIN SCALES - GENERAL
PAINLEVEL_OUTOF10: 5
PAINLEVEL_OUTOF10: 7

## 2023-03-14 NOTE — ED PROVIDER NOTES
[employer] Emergency Department Provider Note                   PCP:                No primary care provider on file. Age: 66 y.o. Sex: female       ICD-10-CM    1. Generalized abdominal pain  R10.84       2. S/P cholecystectomy  Z90.49           DISPOSITION Decision To Discharge 03/14/2023 01:15:34 AM       New Prescriptions    No medications on file       Orders Placed This Encounter   Procedures    CT ABDOMEN PELVIS WO CONTRAST Additional Contrast? None    CBC with Diff    CMP    Lipase    Diet NPO    POCT Urine Dipstick    Saline lock IV    Insert peripheral IV         Nik Decker is a 66 y.o. female who presents to the Emergency Department with chief complaint of    Chief Complaint   Patient presents with    Abdominal Pain      Ms. Shahrzad Whelan is a 81-year-old female with past medical history significant for arthritis, asthma, hypertension, irritable bowel disease/syndrome, H. pylori, who presents with complaint of abdominal pain, distention, bloating less than 24 hours after discharge from laparoscopic cholecystectomy at [facility].  She reports she continues to be distended, bloated, have generalized abdominal pain with some associated nausea. Review of Systems   Constitutional:  Positive for activity change and appetite change. Negative for chills, diaphoresis, fatigue and fever. HENT:  Negative for congestion, facial swelling, nosebleeds and rhinorrhea. Eyes:  Negative for pain, discharge and redness. Respiratory:  Negative for cough, shortness of breath, wheezing and stridor. Cardiovascular:  Negative for chest pain, palpitations and leg swelling. Gastrointestinal:  Positive for abdominal distention, abdominal pain and nausea. Negative for anal bleeding, blood in stool, constipation, diarrhea, rectal pain and vomiting. Genitourinary:  Negative for dysuria, frequency, hematuria, pelvic pain and vaginal discharge.    Musculoskeletal:  Negative for back pain, gait problem, myalgias and neck pain. Skin:  Negative for pallor, rash and wound. Neurological:  Negative for dizziness, tremors, weakness, light-headedness, numbness and headaches. Psychiatric/Behavioral:  Negative for agitation, behavioral problems, confusion and hallucinations. The patient is not nervous/anxious and is not hyperactive. All other systems reviewed and are negative. Past Medical History:   Diagnosis Date    Arthritis     rheumatoid     Asthma     Cancer ([address]     ovarian and cervical    Hypertension         Past Surgical History:   Procedure Laterality Date    COLONOSCOPY N/A 7/2/2019    COLONOSCOPY/ BMI 19 ROOM 363 performed by Hemalatha Cotton MD at [facility]      hysterectomy    OTHER SURGICAL HISTORY      jaw surgery        No family history on file. Social Connections: Not on file        Allergies   Allergen Reactions    Latex Rash    Penicillins Anaphylaxis    Cephalexin Hives    Ciprofloxacin Hives    Hydrocodone Hives    Codeine Rash    Sulfa Antibiotics Rash        Vitals signs and nursing note reviewed. Patient Vitals for the past 4 hrs:   Pulse BP SpO2   03/14/23 0115 -- -- 96 %   03/14/23 0100 -- 114/68 95 %   03/14/23 0045 -- -- 95 %   03/14/23 0040 -- 119/68 95 %   03/14/23 0030 -- -- 96 %   03/14/23 0020 -- 123/71 95 %   03/14/23 0015 -- -- 94 %   03/14/23 0000 -- 110/71 96 %   03/13/23 2359 -- -- 96 %   03/13/23 2351 -- -- 90 %   03/13/23 2350 -- -- 98 %   03/13/23 2338 -- -- 94 %   03/13/23 2330 -- -- 94 %   03/13/23 2327 -- 118/65 96 %   03/13/23 2315 -- -- 95 %   03/13/23 2240 -- 121/74 96 %   03/13/23 2236 -- -- 96 %   03/13/23 2235 -- -- 98 %   03/13/23 2234 78 116/80 99 %          Physical Exam  Vitals and nursing note reviewed. Constitutional:       General: She is not in acute distress. Appearance: Normal appearance. She is well-developed and normal weight. She is not ill-appearing, toxic-appearing or diaphoretic. Comments: Semireclined on stretcher no acute distress. Appears uncomfortable. Nontoxic-appearing. Not acutely ill-appearing. HENT:      Head: Normocephalic and atraumatic. Right Ear: External ear normal.      Left Ear: External ear normal.      Nose: Nose normal. No congestion or rhinorrhea. Mouth/Throat:      Mouth: Mucous membranes are moist.      Pharynx: No oropharyngeal exudate or posterior oropharyngeal erythema. Eyes:      General: No scleral icterus. Right eye: No discharge. Left eye: No discharge. Extraocular Movements: Extraocular movements intact. Pupils: Pupils are equal, round, and reactive to light. Cardiovascular:      Rate and Rhythm: Normal rate. Pulses: Normal pulses. Heart sounds: Normal heart sounds. No murmur heard. No friction rub. No gallop. Pulmonary:      Effort: Pulmonary effort is normal. No respiratory distress. Breath sounds: Normal breath sounds. No stridor. No wheezing, rhonchi or rales. Abdominal:      General: Abdomen is flat. Bowel sounds are normal. There is no distension. Palpations: Abdomen is soft. There is no mass. Tenderness: There is generalized abdominal tenderness (mild). There is no right CVA tenderness, left CVA tenderness, guarding or rebound. Negative signs include Rivera's sign, Rovsing's sign, McBurney's sign, psoas sign and obturator sign. Hernia: No hernia is present. Musculoskeletal:         General: No swelling, tenderness, deformity or signs of injury. Normal range of motion. Cervical back: Normal range of motion and neck supple. No rigidity or tenderness. Right lower leg: No edema. Left lower leg: No edema. Lymphadenopathy:      Cervical: No cervical adenopathy. Skin:     General: Skin is warm. Capillary Refill: Capillary refill takes less than 2 seconds. Coloration: Skin is not cyanotic, jaundiced, mottled or pale.       Findings: No bruising, erythema or rash. Neurological:      General: No focal deficit present. Mental Status: She is alert and oriented to person, place, and time. Motor: No weakness. Coordination: Coordination normal.      Gait: Gait normal.   Psychiatric:         Mood and Affect: Mood normal.         Behavior: Behavior normal.         Thought Content: Thought content normal.         Judgment: Judgment normal.        MDM  Number of Diagnoses or Management Options  Generalized abdominal pain  S/P cholecystectomy  Diagnosis management comments: Appears to most likely have acceptable postoperative pain and distention status post laparoscopic cholecystectomy. CT scan is reviewed/reviewed independent of radiology and reveals no acute pathology and no changes other than that expected immediately status post laparoscopic cholecystectomy. Improved with treatment. Will discharge home with recommended follow-up with PMD.  Clear return precautions discussed. Patient does have prescription for pain and nausea medication that she can fill in the morning.        Amount and/or Complexity of Data Reviewed  Clinical lab tests: ordered and reviewed  Tests in the radiology section of CPT®: ordered and reviewed  Tests in the medicine section of CPT®: ordered and reviewed  Decide to obtain previous medical records or to obtain history from someone other than the patient: yes  Obtain history from someone other than the patient: yes (Adult daughter at bedside.)  Review and summarize past medical records: yes  Independent visualization of images, tracings, or specimens: yes        Procedures    Labs Reviewed   CBC WITH AUTO DIFFERENTIAL - Abnormal; Notable for the following components:       Result Value    WBC 15.2 (*)     RBC 3.96 (*)     Eosinophils % 0 (*)     Absolute Lymph # 6.5 (*)     All other components within normal limits   COMPREHENSIVE METABOLIC PANEL - Abnormal; Notable for the following components:    Sodium 144 (*)     CO2 34 (*) Creatinine 1.19 (*)     Est, Glom Filt Rate 54 (*)     All other components within normal limits   LIPASE        CT ABDOMEN PELVIS WO CONTRAST Additional Contrast? None   Final Result      1. Expected findings of recent cholecystectomy, including minimal edema in the    gallbladder fossa as well as foci of subcutaneous edema and gas in the anterior    abdominal wall. 2. There is otherwise no evidence of acute disease in the abdomen or pelvis,    accounting for the limitations of the noncontrast technique. 3. Large colonic stool burden. 4. Scattered colonic diverticula. 5. Additional chronic findings as above. Yony Ceja M.D.    3/14/2023 12:27:00 AM               Cielo Coma Scale  Eye Opening: Spontaneous  Best Verbal Response: Oriented  Best Motor Response: Obeys commands  Cielo Coma Scale Score: 15                     Voice dictation software was used during the making of this note. This software is not perfect and grammatical and other typographical errors may be present. This note has not been completely proofread for errors.        Ciro Galindo MD  03/14/23 5339

## 2023-03-14 NOTE — ED NOTES
I have reviewed discharge instructions with the patient and daughter. The patient and daughter verbalized understanding. Patient left ED via Discharge Method: ambulatory to Home with family. Opportunity for questions and clarification provided. Patient given 0 scripts. To continue your aftercare when you leave the hospital, you may receive an automated call from our care team to check in on how you are doing. This is a free service and part of our promise to provide the best care and service to meet your aftercare needs.  If you have questions, or wish to unsubscribe from this service please call [phone]. Thank you for Choosing our [facility] Emergency Department.         Marck Patel RN  03/14/23 0140

## 2023-05-15 NOTE — PROGRESS NOTES
Hospitalist Progress Note    7/3/2019  Admit Date: 2019  9:13 PM   NAME: Placido Torres   :  1965   MRN:  [id number]   Attending: Dash Johnson MD  PCP:  Gordon Kuo MD    SUBJECTIVE:   Patient 54F admitted for abdominal pain and constipation. Found to have small bowel obstruction with concern for ileitis. Started on antibiotics and IV steroids. GI consulted. Ileus improved after NG tube decompression. Colonoscopy on  with colon and rectal polypys but w/o signs of inflammation or Crohn's. She reported continued epigastric pain and EGD performed 3 July was unremarkable. 7/3 - seen s/p EGD. Says she is sleepy. Comments on mild RLQ pain only. Wants to eat. Review of Systems negative with exception of pertinent positives noted above  PHYSICAL EXAM     Visit Vitals  /81 (BP 1 Location: Right arm, BP Patient Position: At rest;Supine)   Pulse (!) 53   Temp 97.9 °F (36.6 °C)   Resp 15   Ht 5' 6\" (1.676 m)   Wt 52.6 kg (116 lb)   SpO2 98%   BMI 18.72 kg/m²      Temp (24hrs), Av.8 °F (36.6 °C), Min:96.1 °F (35.6 °C), Max:99 °F (37.2 °C)    Oxygen Therapy  O2 Sat (%): 98 % (19 0848)  Pulse via Oximetry: 76 beats per minute (19 0630)  O2 Device: Nasal cannula (19 1337)  O2 Flow Rate (L/min): 2 l/min (19 1337)    Intake/Output Summary (Last 24 hours) at 7/3/2019 1652  Last data filed at 7/3/2019 1430  Gross per 24 hour   Intake 1461 ml   Output 150 ml   Net 1311 ml      General: No acute distress    Lungs:  CTA Bilaterally.    Heart:  Regular rate and rhythm,  No murmur, rub, or gallop  Abdomen: Soft, Non distended, tender to Deep palpation RLQ, Positive bowel sounds  Extremities: No cyanosis, clubbing or edema  Neurologic:  No focal deficits    ASSESSMENT      Active Hospital Problems    Diagnosis Date Noted    Terminal ileitis ([address] 2019    SBO (small bowel obstruction) ([facility]) 2019    Leukocytosis 2019     A/P  - Ileitis with ileus - w/o inflammation or changes of Crohns seen on colo. Clinically improved. Off steroids and atbx now. EGD/COLO as above. RUQ unremarkable.    Follow CMP in AM  Cont PPI  Consider DC in AM if tolerating diet    DVT Prophylaxis: scds  Signed By: Prabhakar Childers DO     July 3, 2019 Cyclophosphamide Counseling:  I discussed with the patient the risks of cyclophosphamide including but not limited to hair loss, hormonal abnormalities, decreased fertility, abdominal pain, diarrhea, nausea and vomiting, bone marrow suppression and infection. The patient understands that monitoring is required while taking this medication.

## 2024-02-06 NOTE — PROGRESS NOTES
Pt transported back to [address] [place] [address] via ambulance transport in stable condition. Respiratory

## 2024-03-17 ENCOUNTER — HOSPITAL ENCOUNTER (EMERGENCY)
Age: 59
Discharge: HOME | End: 2024-03-17

## 2024-03-17 ENCOUNTER — APPOINTMENT (OUTPATIENT)
Dept: GENERAL RADIOLOGY | Age: 59
End: 2024-03-17

## 2024-03-17 ENCOUNTER — APPOINTMENT (OUTPATIENT)
Dept: CT IMAGING | Age: 59
End: 2024-03-17

## 2024-03-17 VITALS
HEIGHT: 66 IN | RESPIRATION RATE: 16 BRPM | OXYGEN SATURATION: 96 % | SYSTOLIC BLOOD PRESSURE: 152 MMHG | WEIGHT: 156 LBS | DIASTOLIC BLOOD PRESSURE: 79 MMHG | TEMPERATURE: 97.9 F | HEART RATE: 71 BPM | BODY MASS INDEX: 25.07 KG/M2

## 2024-03-17 DIAGNOSIS — R10.13 CHRONIC EPIGASTRIC PAIN: Primary | ICD-10-CM

## 2024-03-17 DIAGNOSIS — K52.9 CHRONIC INFLAMMATION OF COLON: ICD-10-CM

## 2024-03-17 DIAGNOSIS — R11.0 CHRONIC NAUSEA: ICD-10-CM

## 2024-03-17 DIAGNOSIS — G89.29 CHRONIC EPIGASTRIC PAIN: Primary | ICD-10-CM

## 2024-03-17 PROBLEM — K29.50 CHRONIC GASTRITIS: Status: ACTIVE | Noted: 2023-11-28

## 2024-03-17 PROBLEM — R19.7 DIARRHEA: Status: ACTIVE | Noted: 2023-05-01

## 2024-03-17 PROBLEM — R68.81 EARLY SATIETY: Status: ACTIVE | Noted: 2023-05-01

## 2024-03-17 PROBLEM — F17.210 CIGARETTE NICOTINE DEPENDENCE, UNCOMPLICATED: Status: ACTIVE | Noted: 2023-07-08

## 2024-03-17 PROBLEM — M25.511 CHRONIC RIGHT SHOULDER PAIN: Status: ACTIVE | Noted: 2020-08-27

## 2024-03-17 PROBLEM — I10 ESSENTIAL HYPERTENSION: Status: ACTIVE | Noted: 2020-06-08

## 2024-03-17 PROBLEM — F41.9 ANXIETY DISORDER: Status: ACTIVE | Noted: 2023-06-05

## 2024-03-17 PROBLEM — Z86.19 HISTORY OF HELICOBACTER PYLORI INFECTION: Status: ACTIVE | Noted: 2023-11-28

## 2024-03-17 PROBLEM — K21.00 GASTROESOPHAGEAL REFLUX DISEASE WITH ESOPHAGITIS WITHOUT HEMORRHAGE: Status: ACTIVE | Noted: 2023-05-01

## 2024-03-17 PROBLEM — Z86.010 PERSONAL HISTORY OF COLONIC POLYPS: Status: ACTIVE | Noted: 2020-10-15

## 2024-03-17 PROBLEM — R32 URINARY INCONTINENCE IN FEMALE: Status: ACTIVE | Noted: 2023-11-28

## 2024-03-17 PROBLEM — B07.0 PLANTAR WART OF LEFT FOOT: Status: ACTIVE | Noted: 2023-06-05

## 2024-03-17 PROBLEM — K31.A0 GASTRIC INTESTINAL METAPLASIA: Status: ACTIVE | Noted: 2023-08-03

## 2024-03-17 PROBLEM — K58.2 IRRITABLE BOWEL SYNDROME WITH BOTH CONSTIPATION AND DIARRHEA: Status: ACTIVE | Noted: 2023-10-11

## 2024-03-17 PROBLEM — R14.0 ABDOMINAL BLOATING: Status: ACTIVE | Noted: 2023-08-03

## 2024-03-17 PROBLEM — Z12.2 ENCOUNTER FOR SCREENING FOR MALIGNANT NEOPLASM OF RESPIRATORY ORGANS: Status: ACTIVE | Noted: 2023-07-08

## 2024-03-17 PROBLEM — E78.5 HYPERLIPIDEMIA: Status: ACTIVE | Noted: 2023-08-16

## 2024-03-17 PROBLEM — R93.3 ABNORMAL CT SCAN, COLON: Status: ACTIVE | Noted: 2023-05-01

## 2024-03-17 PROBLEM — N39.3 STRESS INCONTINENCE OF URINE: Status: ACTIVE | Noted: 2020-08-27

## 2024-03-17 PROBLEM — K59.09 OTHER CONSTIPATION: Status: ACTIVE | Noted: 2020-08-27

## 2024-03-17 LAB
ALBUMIN SERPL-MCNC: 4.1 G/DL (ref 3.5–5)
ALBUMIN/GLOB SERPL: 1.3 (ref 0.4–1.6)
ALP SERPL-CCNC: 90 U/L (ref 50–130)
ALT SERPL-CCNC: 32 U/L (ref 12–65)
ANION GAP SERPL CALC-SCNC: 7 MMOL/L (ref 2–11)
AST SERPL-CCNC: 14 U/L (ref 15–37)
BASOPHILS # BLD: 0 K/UL (ref 0–0.2)
BASOPHILS NFR BLD: 1 % (ref 0–2)
BILIRUB SERPL-MCNC: 0.7 MG/DL (ref 0.2–1.1)
BUN SERPL-MCNC: 12 MG/DL (ref 6–23)
CALCIUM SERPL-MCNC: 9.4 MG/DL (ref 8.3–10.4)
CHLORIDE SERPL-SCNC: 110 MMOL/L (ref 103–113)
CO2 SERPL-SCNC: 26 MMOL/L (ref 21–32)
CREAT SERPL-MCNC: 1.1 MG/DL (ref 0.6–1)
DIFFERENTIAL METHOD BLD: ABNORMAL
EOSINOPHIL # BLD: 0.1 K/UL (ref 0–0.8)
EOSINOPHIL NFR BLD: 1 % (ref 0.5–7.8)
ERYTHROCYTE [DISTWIDTH] IN BLOOD BY AUTOMATED COUNT: 13.8 % (ref 11.9–14.6)
GLOBULIN SER CALC-MCNC: 3.2 G/DL (ref 2.8–4.5)
GLUCOSE SERPL-MCNC: 151 MG/DL (ref 65–100)
HCT VFR BLD AUTO: 44.5 % (ref 35.8–46.3)
HGB BLD-MCNC: 14.5 G/DL (ref 11.7–15.4)
IMM GRANULOCYTES # BLD AUTO: 0 K/UL (ref 0–0.5)
IMM GRANULOCYTES NFR BLD AUTO: 0 % (ref 0–5)
LIPASE SERPL-CCNC: 156 U/L (ref 73–393)
LYMPHOCYTES # BLD: 3.9 K/UL (ref 0.5–4.6)
LYMPHOCYTES NFR BLD: 52 % (ref 13–44)
MCH RBC QN AUTO: 30.8 PG (ref 26.1–32.9)
MCHC RBC AUTO-ENTMCNC: 32.6 G/DL (ref 31.4–35)
MCV RBC AUTO: 94.5 FL (ref 82–102)
MONOCYTES # BLD: 0.4 K/UL (ref 0.1–1.3)
MONOCYTES NFR BLD: 5 % (ref 4–12)
NEUTS SEG # BLD: 3.1 K/UL (ref 1.7–8.2)
NEUTS SEG NFR BLD: 41 % (ref 43–78)
NRBC # BLD: 0 K/UL (ref 0–0.2)
PLATELET # BLD AUTO: 275 K/UL (ref 150–450)
PMV BLD AUTO: 10.3 FL (ref 9.4–12.3)
POTASSIUM SERPL-SCNC: 3.5 MMOL/L (ref 3.5–5.1)
PROT SERPL-MCNC: 7.3 G/DL (ref 6.3–8.2)
RBC # BLD AUTO: 4.71 M/UL (ref 4.05–5.2)
SODIUM SERPL-SCNC: 143 MMOL/L (ref 136–146)
TROPONIN I SERPL HS-MCNC: 3.6 PG/ML (ref 0–14)
WBC # BLD AUTO: 7.5 K/UL (ref 4.3–11.1)

## 2024-03-17 PROCEDURE — A4216 STERILE WATER/SALINE, 10 ML: HCPCS

## 2024-03-17 PROCEDURE — 83690 ASSAY OF LIPASE: CPT

## 2024-03-17 PROCEDURE — 6360000002 HC RX W HCPCS

## 2024-03-17 PROCEDURE — 93005 ELECTROCARDIOGRAM TRACING: CPT

## 2024-03-17 PROCEDURE — 84484 ASSAY OF TROPONIN QUANT: CPT

## 2024-03-17 PROCEDURE — 74018 RADEX ABDOMEN 1 VIEW: CPT

## 2024-03-17 PROCEDURE — 99285 EMERGENCY DEPT VISIT HI MDM: CPT

## 2024-03-17 PROCEDURE — 6370000000 HC RX 637 (ALT 250 FOR IP)

## 2024-03-17 PROCEDURE — C9113 INJ PANTOPRAZOLE SODIUM, VIA: HCPCS

## 2024-03-17 PROCEDURE — 96374 THER/PROPH/DIAG INJ IV PUSH: CPT

## 2024-03-17 PROCEDURE — 6360000004 HC RX CONTRAST MEDICATION

## 2024-03-17 PROCEDURE — 96375 TX/PRO/DX INJ NEW DRUG ADDON: CPT

## 2024-03-17 PROCEDURE — 74177 CT ABD & PELVIS W/CONTRAST: CPT

## 2024-03-17 PROCEDURE — 2580000003 HC RX 258

## 2024-03-17 PROCEDURE — 85025 COMPLETE CBC W/AUTO DIFF WBC: CPT

## 2024-03-17 PROCEDURE — 80053 COMPREHEN METABOLIC PANEL: CPT

## 2024-03-17 RX ORDER — FAMOTIDINE 20 MG/1
10 TABLET, FILM COATED ORAL 2 TIMES DAILY
Qty: 30 TABLET | Refills: 0 | Status: SHIPPED | OUTPATIENT
Start: 2024-03-17 | End: 2024-04-16

## 2024-03-17 RX ORDER — ONDANSETRON 2 MG/ML
4 INJECTION INTRAMUSCULAR; INTRAVENOUS
Status: DISCONTINUED | OUTPATIENT
Start: 2024-03-17 | End: 2024-03-17

## 2024-03-17 RX ORDER — SUCRALFATE 1 G/1
1 TABLET ORAL 3 TIMES DAILY
Qty: 90 TABLET | Refills: 0 | Status: SHIPPED | OUTPATIENT
Start: 2024-03-17 | End: 2024-04-16

## 2024-03-17 RX ORDER — ONDANSETRON 2 MG/ML
4 INJECTION INTRAMUSCULAR; INTRAVENOUS
Status: COMPLETED | OUTPATIENT
Start: 2024-03-17 | End: 2024-03-17

## 2024-03-17 RX ORDER — DICYCLOMINE HYDROCHLORIDE 10 MG/1
20 CAPSULE ORAL
Status: COMPLETED | OUTPATIENT
Start: 2024-03-17 | End: 2024-03-17

## 2024-03-17 RX ORDER — ONDANSETRON 4 MG/1
4 TABLET, FILM COATED ORAL 3 TIMES DAILY PRN
Qty: 15 TABLET | Refills: 2 | Status: SHIPPED | OUTPATIENT
Start: 2024-03-17 | End: 2024-04-16

## 2024-03-17 RX ORDER — MAGNESIUM HYDROXIDE/ALUMINUM HYDROXICE/SIMETHICONE 120; 1200; 1200 MG/30ML; MG/30ML; MG/30ML
30 SUSPENSION ORAL
Status: COMPLETED | OUTPATIENT
Start: 2024-03-17 | End: 2024-03-17

## 2024-03-17 RX ORDER — LIDOCAINE HYDROCHLORIDE 20 MG/ML
15 SOLUTION OROPHARYNGEAL
Status: COMPLETED | OUTPATIENT
Start: 2024-03-17 | End: 2024-03-17

## 2024-03-17 RX ADMIN — LIDOCAINE HYDROCHLORIDE 15 ML: 20 SOLUTION ORAL; TOPICAL at 16:07

## 2024-03-17 RX ADMIN — ONDANSETRON 4 MG: 2 INJECTION INTRAMUSCULAR; INTRAVENOUS at 16:06

## 2024-03-17 RX ADMIN — DICYCLOMINE HYDROCHLORIDE 20 MG: 10 CAPSULE ORAL at 16:03

## 2024-03-17 RX ADMIN — ALUMINUM HYDROXIDE, MAGNESIUM HYDROXIDE, DIMETHICONE 30 ML: 200; 200; 20 LIQUID ORAL at 16:07

## 2024-03-17 RX ADMIN — IOPAMIDOL 100 ML: 755 INJECTION, SOLUTION INTRAVENOUS at 15:04

## 2024-03-17 RX ADMIN — PANTOPRAZOLE SODIUM 80 MG: 40 INJECTION, POWDER, FOR SOLUTION INTRAVENOUS at 16:06

## 2024-03-17 ASSESSMENT — PAIN - FUNCTIONAL ASSESSMENT: PAIN_FUNCTIONAL_ASSESSMENT: 0-10

## 2024-03-17 ASSESSMENT — PAIN SCALES - GENERAL
PAINLEVEL_OUTOF10: 7
PAINLEVEL_OUTOF10: 7
PAINLEVEL_OUTOF10: 6

## 2024-03-17 ASSESSMENT — PAIN DESCRIPTION - LOCATION: LOCATION: ABDOMEN

## 2024-03-17 ASSESSMENT — LIFESTYLE VARIABLES
HOW OFTEN DO YOU HAVE A DRINK CONTAINING ALCOHOL: NEVER
HOW MANY STANDARD DRINKS CONTAINING ALCOHOL DO YOU HAVE ON A TYPICAL DAY: PATIENT DOES NOT DRINK

## 2024-03-17 NOTE — ED PROVIDER NOTES
and/or kVp according to patient's size,  iterative reconstruction.    COMPARISON: CT abdomen pelvis March 13, 2023    FINDINGS:  - LUNG BASES: No infiltrates or masses.    - LIVER: Normal in size and appearance.    - GALLBLADDER/BILE DUCTS: Status post cholecystectomy.  - PANCREAS: Normal.  - SPLEEN: Normal.    - ADRENALS: Normal.  - KIDNEYS/URETERS: No hydronephrosis or significant mass. Left renal cysts.  - BLADDER: Normal.  - REPRODUCTIVE ORGANS: No pelvic masses.    - BOWEL: No evidence of bowel obstruction. Fat deposition within the ascending  colonic wall.  - LYMPH NODES: No significant retroperitoneal, mesenteric, or pelvic adenopathy.  - BONES: No fracture or significant bone lesion.  - VASCULATURE: Diffuse atherosclerotic vascular disease.  - OTHER: No ascites.      Impression    Fat in position within the ascending colonic wall suggesting sequelae of chronic  inflammation.    Status post cholecystectomy.    If there are any questions about this report, I can be reached on [employer]  or at [phone]     CBC with Diff   Result Value Ref Range    WBC 7.5 4.3 - 11.1 K/uL    RBC 4.71 4.05 - 5.2 M/uL    Hemoglobin 14.5 11.7 - 15.4 g/dL    Hematocrit 44.5 35.8 - 46.3 %    MCV 94.5 82.0 - 102.0 FL    MCH 30.8 26.1 - 32.9 PG    MCHC 32.6 31.4 - 35.0 g/dL    RDW 13.8 11.9 - 14.6 %    Platelets 275 150 - 450 K/uL    MPV 10.3 9.4 - 12.3 FL    nRBC 0.00 0.0 - 0.2 K/uL    Differential Type AUTOMATED      Neutrophils % 41 (L) 43 - 78 %    Lymphocytes % 52 (H) 13 - 44 %    Monocytes % 5 4.0 - 12.0 %    Eosinophils % 1 0.5 - 7.8 %    Basophils % 1 0.0 - 2.0 %    Immature Granulocytes 0 0.0 - 5.0 %    Neutrophils Absolute 3.1 1.7 - 8.2 K/UL    Lymphocytes Absolute 3.9 0.5 - 4.6 K/UL    Monocytes Absolute 0.4 0.1 - 1.3 K/UL    Eosinophils Absolute 0.1 0.0 - 0.8 K/UL    Basophils Absolute 0.0 0.0 - 0.2 K/UL    Absolute Immature Granulocyte 0.0 0.0 - 0.5 K/UL   CMP   Result Value Ref Range    Sodium 143 136 - 146 mmol/L

## 2024-03-17 NOTE — ED TRIAGE NOTES
Pt reports epigastric pain \"for years\" pt reports feels swelling in abdomin x 5 days. Reports associated nausea. Pt reports normal bowel pattern.belly distended soft. Diffusely tender. States seen at [facility] and [facility] in past for similar issues. Reports has history of severe gastritis and IBS. Advised by GI for fecal transplant. Here for second opinion.

## 2024-03-17 NOTE — DISCHARGE INSTRUCTIONS
You were evaluated in the emergency department today for acute worsening of your chronic epigastric pain and chronic nausea    Overall physical exam is reassuring  CT imaging today is negative for evidence of obstruction, no sign of colitis, no sign of diverticulitis, no acute findings.  You do have evidence of chronic inflammation of your left side of your colon.    Lab work is reassuring without any emergent finding  No concerning findings on EKG  Normal cardiac enzyme    I have put in an urgent referral to our gastroenterology providers.  They will call you or central scheduling will call you for a follow-up    I have written you prescription for Carafate to help with soothing of your stomach, famotidine to take twice daily, Zofran to help with nausea continue to take all of your prescribed medications    In the interim I do recommend you follow-up with your regular GI doctor and your primary care provider within the next 2 weeks.    Return to the emergency department for chest pain, shortness of breath, signs and symptoms of stroke as listed in your discharge paperwork or general worsening of your condition

## 2024-03-18 LAB
EKG ATRIAL RATE: 61 BPM
EKG DIAGNOSIS: NORMAL
EKG P AXIS: 61 DEGREES
EKG P-R INTERVAL: 180 MS
EKG Q-T INTERVAL: 456 MS
EKG QRS DURATION: 80 MS
EKG QTC CALCULATION (BAZETT): 459 MS
EKG R AXIS: 44 DEGREES
EKG T AXIS: 64 DEGREES
EKG VENTRICULAR RATE: 61 BPM

## 2024-03-18 PROCEDURE — 93010 ELECTROCARDIOGRAM REPORT: CPT | Performed by: INTERNAL MEDICINE

## (undated) DEVICE — CONNECTOR TBNG OD5-7MM O2 END DISP

## (undated) DEVICE — CANNULA NSL ORAL AD FOR CAPNOFLEX CO2 O2 AIRLFE

## (undated) DEVICE — KENDALL RADIOLUCENT FOAM MONITORING ELECTRODE RECTANGULAR SHAPE: Brand: KENDALL

## (undated) DEVICE — NDL PRT INJ NSAF BLNT 18GX1.5 --

## (undated) DEVICE — SYR 5ML 1/5 GRAD LL NSAF LF --

## (undated) DEVICE — SNARE POLYP SM W13MMXL240CM SHTH DIA2.4MM OVL FLX DISP

## (undated) DEVICE — FORCEPS BX L240CM JAW DIA2.8MM L CAP W/ NDL MIC MESH TOOTH

## (undated) DEVICE — SYR 3ML LL TIP 1/10ML GRAD --

## (undated) DEVICE — REM POLYHESIVE ADULT PATIENT RETURN ELECTRODE: Brand: VALLEYLAB

## (undated) DEVICE — BLOCK BITE AD 60FR W/ VELC STRP ADDRESSES MOST PT AND

## (undated) DEVICE — CONTAINER PREFIL FRMLN 40ML --